# Patient Record
Sex: FEMALE | Race: WHITE | NOT HISPANIC OR LATINO | Employment: PART TIME | URBAN - NONMETROPOLITAN AREA
[De-identification: names, ages, dates, MRNs, and addresses within clinical notes are randomized per-mention and may not be internally consistent; named-entity substitution may affect disease eponyms.]

---
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
## 2020-01-02 DIAGNOSIS — F91.1 UNDERSOCIALIZED CONDUCT DISORDER, AGGRESSIVE TYPE: ICD-10-CM

## 2020-01-02 DIAGNOSIS — F91.3 OPPOSITIONAL DEFIANT DISORDER: ICD-10-CM

## 2020-01-02 RX ORDER — QUETIAPINE FUMARATE 200 MG/1
TABLET, FILM COATED ORAL
Qty: 90 TABLET | Refills: 0 | Status: SHIPPED | OUTPATIENT
Start: 2020-01-02 | End: 2020-02-03

## 2020-01-02 NOTE — LETTER
January 2, 2020      Fadia Roque  [address] [facility] [address]        Dear Fadia,           This letter is to remind you that you are due for your annual exam with Krista Bowman. Your last comprehensive visit was more than 12 months ago.    A refill request for your Seroquel has been sent to a provider for review and consideration. Additional refills require you to complete this appointment.    Please call the clinic at [phone] to schedule your appointment.    If you should require additional refills before your scheduled appointment, please contact your pharmacy and we will refill your medication until the date of your appointment.      Thank you for choosing [facility] and [facility] for your health care needs.    Sincerely,    Refill RN  [facility]

## 2020-01-02 NOTE — TELEPHONE ENCOUNTER
Disp Refills Start End MIRELA   QUEtiapine (SEROQUEL) 200 MG tablet 90 tablet 2 10/1/2019  No   Sig: TAKE 1 TABLET BY MOUTH THREE TIMES DAILY       LOV: 6/19/2018  Future Office visit:   No appointment scheduled at this time.    Annual reminder letter sent via mail. Attempted to contact patient. Left message via phone.     Routing refill request to provider for review/approval because:  Failed protocol  Unable to complete prescription refill per RN Medication Refill Policy.................... Madalyn Armstrong RN ....................  1/2/2020   11:54 AM    Requested Prescriptions   Pending Prescriptions Disp Refills     QUEtiapine (SEROQUEL) 200 MG tablet [Pharmacy Med Name: QUETIAPINE 200MG TABLETS] 90 tablet 2     Sig: TAKE 1 TABLET BY MOUTH THREE TIMES DAILY       Antipsychotic Medications Failed - 1/2/2020  9:02 AM        Failed - Blood pressure under 140/90 in past 12 months     BP Readings from Last 3 Encounters:   06/19/18 112/60 (56 %/ 27 %)*   03/19/18 128/75 (95 %/ 84 %)*   03/12/18 110/80 (50 %/ 94 %)*     *BP percentiles are based on the 2017 AAP Clinical Practice Guideline for girls                 Failed - Lipid panel on file within the past 12 months     Recent Labs   Lab Test 06/07/16  1057   TRIG 170*   HDL 38   LDL 83               Failed - CBC on file in past 12 months     Recent Labs   Lab Test 06/07/16  1038   GICHWBC 8.6   GICHRBC 4.85   HGB 14.5   HCT 42.5                    Failed - Heart Rate on file within past 12 months     Pulse Readings from Last 3 Encounters:   06/19/18 92   03/12/18 80   12/19/17 96               Failed - A1c or Glucose on file in past 12 months     Recent Labs   Lab Test 04/26/18  0859   GLC 85       Please review patients last 3 weights. If a weight gain of >10 lbs exists, you may refill the prescription once after instructing the patient to schedule an appointment within the next 30 days.    Wt Readings from Last 3 Encounters:   06/19/18 77.9 kg (171 lb 12.8  "oz) (94 %)*   03/19/18 77.1 kg (170 lb) (93 %)*   03/12/18 77.2 kg (170 lb 3.2 oz) (94 %)*     * Growth percentiles are based on [facility] (Girls, 2-20 Years) data.             Failed - Recent (6 mo) or future (30 days) visit within the authorizing provider's specialty     Patient had office visit in the last 6 months or has a visit in the next 30 days with authorizing provider or within the authorizing provider's specialty.  See \"Patient Info\" tab in inbasket, or \"Choose Columns\" in Meds & Orders section of the refill encounter.            Passed - Patient is 12 years of age or older        Passed - Medication is active on med list        Passed - Patient is not pregnant        Passed - No positve pregnancy test on file in past 12 months        "

## 2020-01-22 DIAGNOSIS — F91.1 UNDERSOCIALIZED CONDUCT DISORDER, AGGRESSIVE TYPE: ICD-10-CM

## 2020-01-22 DIAGNOSIS — F91.3 OPPOSITIONAL DEFIANT DISORDER: ICD-10-CM

## 2020-01-22 NOTE — LETTER
January 23, 2020      Fadia Roque  [address] [facility] [address]        Dear Fadia,         This letter is to remind you that you are due for your annual exam with Krista Bowman. Your last comprehensive visit was more than 12 months ago.    A refill request was sent for your clonidine to your provider for review and consideration. Additional refills require you to complete this appointment.    Please call the clinic at [phone] to schedule your appointment.    If you should require additional refills before your scheduled appointment, please contact your pharmacy and we will refill your medication until the date of your appointment.    Thank you for choosing [facility] and [facility] for your health care needs.    Sincerely,    Refill RN  [facility]

## 2020-01-23 RX ORDER — CLONIDINE HYDROCHLORIDE 0.1 MG/1
TABLET ORAL
Qty: 45 TABLET | Refills: 5 | Status: SHIPPED | OUTPATIENT
Start: 2020-01-23 | End: 2020-09-08

## 2020-01-23 NOTE — TELEPHONE ENCOUNTER
" Disp Refills Start End MIRELA   cloNIDine (CATAPRES) 0.1 MG tablet 60 tablet 5 6/19/2018  No   Sig - Route: Take 0.5 tablets (0.05 mg) by mouth 3 times daily - Oral       LOV: 6/19/2018  Future Office visit:    Next 5 appointments (look out 90 days)    Feb 04, 2020  7:45 AM CST  Nurse Only with GH INJECTION NURSE  [facility] ([facility]) [address]  [place] [address] [phone]        Office note: \" FOLLOW UP: in 6 months.  Will followup with neurology in 9 months. Krista Bowman MD \"    Annual reminder sent via mail.     Routing refill request to provider for review/approval because:  Failed Protocol    Requested Prescriptions   Pending Prescriptions Disp Refills     cloNIDine (CATAPRES) 0.1 MG tablet [Pharmacy Med Name: CLONIDINE 0.1MG TABLETS] 45 tablet      Sig: TAKE 1/2 TABLET BY MOUTH THREE TIMES DAILY DOSE= 0.05 MG= 1/2 TABLET       Central Acting Antiadrenergic Agents Failed - 1/23/2020 12:40 PM        Failed - Blood pressure under 140/90 in past 12 months     BP Readings from Last 3 Encounters:   06/19/18 112/60 (56 %/ 27 %)*   03/19/18 128/75 (95 %/ 84 %)*   03/12/18 110/80 (50 %/ 94 %)*     *BP percentiles are based on the 2017 AAP Clinical Practice Guideline for girls                 Failed - Recent (12 mo) or future (30 days) visit within the authorizing provider's specialty     Patient has had an office visit with the authorizing provider or a provider within the authorizing providers department within the previous 12 mos or has a future within next 30 days. See \"Patient Info\" tab in inbasket, or \"Choose Columns\" in Meds & Orders section of the refill encounter.              Failed - Normal serum creatinine on file within past 12 months     Recent Labs   Lab Test 04/26/18  0859   CR 0.80             Passed - Patient is 6 years of age or older        Passed - Medication is active on med list        Passed - Patient not pregnant        " Passed - No positive pregnancy test on file in past 12 months      Unable to complete prescription refill per RN Medication Refill Policy.................... Madalyn Armstrong RN ....................  1/23/2020   4:08 PM

## 2020-02-04 ENCOUNTER — ALLIED HEALTH/NURSE VISIT (OUTPATIENT)
Dept: FAMILY MEDICINE | Facility: OTHER | Age: 20
End: 2020-02-04
Attending: PEDIATRICS
Payer: MEDICAID

## 2020-02-04 DIAGNOSIS — Z30.42 ENCOUNTER FOR DEPO-PROVERA CONTRACEPTION: Primary | ICD-10-CM

## 2020-02-04 PROCEDURE — 25000128 H RX IP 250 OP 636: Performed by: PEDIATRICS

## 2020-02-04 PROCEDURE — 96372 THER/PROPH/DIAG INJ SC/IM: CPT

## 2020-02-04 RX ADMIN — MEDROXYPROGESTERONE ACETATE 150 MG: 150 INJECTION, SUSPENSION INTRAMUSCULAR at 07:58

## 2020-02-04 NOTE — PROGRESS NOTES
Verified patient's name and . Patient stated reason for visit today is to receive Depo. Patient denied any concerns with previous injections. Weight assessed: 176.4#. BP assessed: 122/80.    Patient requests ongoing injections of Depo-Provera  Date of last DMPA:  2019    The following questions asked by nurse:   Adverse reaction to last shot?  no  Heavy bleeding or more than 14 days bleeding sincelast shot?  no  Wants to continue contraception for another 3 months, knowing that fertility may not return for up to 18 months?  yes  Recent migraine headaches?  no  Breast problems since last shot?  no  Problems with excessive hair loss, acne or facial hair?  no    Depo Provera injection prepared and administered IM as ordered. Administration of medication documented in MAR (see MAR for further information regarding dose, lot #, NDC #, expiration date). Next Depo Provera injection in series due  - May 6, 2020. Patient provided appointment reminder card.       Grace CROCKETTN, RN on 2020 at 7:58 AM

## 2020-02-21 ENCOUNTER — OFFICE VISIT (OUTPATIENT)
Dept: PEDIATRICS | Facility: OTHER | Age: 20
End: 2020-02-21
Attending: PEDIATRICS
Payer: MEDICAID

## 2020-02-21 VITALS — SYSTOLIC BLOOD PRESSURE: 120 MMHG | DIASTOLIC BLOOD PRESSURE: 90 MMHG

## 2020-02-21 DIAGNOSIS — F91.3 OPPOSITIONAL DEFIANT DISORDER: ICD-10-CM

## 2020-02-21 DIAGNOSIS — N94.89 MENSTRUAL SUPPRESSION: ICD-10-CM

## 2020-02-21 DIAGNOSIS — Q82.5 CONGENITAL NEVUS OF SCALP: ICD-10-CM

## 2020-02-21 DIAGNOSIS — Z00.129 ENCOUNTER FOR ROUTINE CHILD HEALTH EXAMINATION W/O ABNORMAL FINDINGS: Primary | ICD-10-CM

## 2020-02-21 DIAGNOSIS — E66.3 OVERWEIGHT IN CHILDHOOD WITH BODY MASS INDEX (BMI) OF 85TH TO 94.9TH PERCENTILE: ICD-10-CM

## 2020-02-21 DIAGNOSIS — R62.50 DEVELOPMENTAL DELAY: ICD-10-CM

## 2020-02-21 DIAGNOSIS — G40.909 SEIZURE DISORDER (H): ICD-10-CM

## 2020-02-21 DIAGNOSIS — L24.9 IRRITANT HAND DERMATITIS: ICD-10-CM

## 2020-02-21 LAB
ALBUMIN SERPL-MCNC: 4.7 G/DL (ref 3.5–5.7)
ALP SERPL-CCNC: 59 U/L (ref 34–104)
ALT SERPL W P-5'-P-CCNC: 35 U/L (ref 7–52)
ANION GAP SERPL CALCULATED.3IONS-SCNC: 9 MMOL/L (ref 3–14)
AST SERPL W P-5'-P-CCNC: 23 U/L (ref 13–39)
BILIRUB SERPL-MCNC: 0.4 MG/DL (ref 0.3–1)
BUN SERPL-MCNC: 12 MG/DL (ref 7–25)
CALCIUM SERPL-MCNC: 9.8 MG/DL (ref 8.6–10.3)
CHLORIDE SERPL-SCNC: 105 MMOL/L (ref 98–107)
CHOLEST SERPL-MCNC: 137 MG/DL
CO2 SERPL-SCNC: 26 MMOL/L (ref 21–31)
CREAT SERPL-MCNC: 0.87 MG/DL (ref 0.6–1.2)
GFR SERPL CREATININE-BSD FRML MDRD: 84 ML/MIN/{1.73_M2}
GLUCOSE SERPL-MCNC: 131 MG/DL (ref 70–105)
HBA1C MFR BLD: 4.4 % (ref 4–6)
HDLC SERPL-MCNC: 36 MG/DL (ref 23–92)
LDLC SERPL CALC-MCNC: 83 MG/DL
NONHDLC SERPL-MCNC: 101 MG/DL
POTASSIUM SERPL-SCNC: 4 MMOL/L (ref 3.5–5.1)
PROT SERPL-MCNC: 7.8 G/DL (ref 6.4–8.9)
SODIUM SERPL-SCNC: 140 MMOL/L (ref 134–144)
T4 FREE SERPL-MCNC: 0.87 NG/DL (ref 0.6–1.6)
TRIGL SERPL-MCNC: 89 MG/DL
TSH SERPL DL<=0.05 MIU/L-ACNC: 3.06 IU/ML (ref 0.34–5.6)

## 2020-02-21 PROCEDURE — 84443 ASSAY THYROID STIM HORMONE: CPT | Mod: ZL | Performed by: PEDIATRICS

## 2020-02-21 PROCEDURE — 83036 HEMOGLOBIN GLYCOSYLATED A1C: CPT | Mod: ZL | Performed by: PEDIATRICS

## 2020-02-21 PROCEDURE — 80061 LIPID PANEL: CPT | Mod: ZL | Performed by: PEDIATRICS

## 2020-02-21 PROCEDURE — 80053 COMPREHEN METABOLIC PANEL: CPT | Mod: ZL | Performed by: PEDIATRICS

## 2020-02-21 PROCEDURE — 80175 DRUG SCREEN QUAN LAMOTRIGINE: CPT | Mod: ZL | Performed by: PEDIATRICS

## 2020-02-21 PROCEDURE — 36415 COLL VENOUS BLD VENIPUNCTURE: CPT | Mod: ZL | Performed by: PEDIATRICS

## 2020-02-21 PROCEDURE — 84439 ASSAY OF FREE THYROXINE: CPT | Mod: ZL | Performed by: PEDIATRICS

## 2020-02-21 PROCEDURE — 99214 OFFICE O/P EST MOD 30 MIN: CPT | Performed by: PEDIATRICS

## 2020-02-21 PROCEDURE — G0463 HOSPITAL OUTPT CLINIC VISIT: HCPCS

## 2020-02-21 RX ORDER — TRIAMCINOLONE ACETONIDE 1 MG/G
CREAM TOPICAL 2 TIMES DAILY
Qty: 80 G | Refills: 3 | Status: SHIPPED | OUTPATIENT
Start: 2020-02-21

## 2020-02-21 RX ORDER — MIDAZOLAM HYDROCHLORIDE 5 MG/ML
INJECTION INTRAMUSCULAR; INTRAVENOUS
Refills: 3 | COMMUNITY
Start: 2019-03-02 | End: 2022-02-21

## 2020-02-21 RX ORDER — LAMOTRIGINE 200 MG/1
TABLET ORAL
COMMUNITY
Start: 2020-01-26 | End: 2022-02-21

## 2020-02-21 ASSESSMENT — PAIN SCALES - GENERAL: PAINLEVEL: NO PAIN (0)

## 2020-02-21 ASSESSMENT — MIFFLIN-ST. JEOR: SCORE: 1534.57

## 2020-02-21 NOTE — NURSING NOTE
Patient presents with caregiver for 19 year physical.    Medication Reconciliation: complete    Caro Roldan LPN  2/21/2020 8:52 AM

## 2020-02-21 NOTE — PROGRESS NOTES
SUBJECTIVE:   Fadia Roque is a 19 year old female  who presents to clinic today with care giver, Analia because of:    Patient presents with:  Physical: 19 year       HPI  Fadia has been having a lot of behaviors between 3 and 7 pm.  She doesn't listen well and if you have to tell her more than three times, she screams and bites herself and behavior escalates.      No seizures since 2013    She has a mole by one of her ears.   It has been there since she was a small child.  It isn't bothering her, but mom questions if it is growing larger.      Fadia picks at her hands a lot.   The left hand is red and irritated.      Fadia's weight has been fairly stable.   She is exercising.  She is in [employer] at school.  She is girl  and will be going to camp.  She has custom training wheels on a regular bike.          ROS  Review of Systems   HENT:        Had a cold two weeks ago, but is now recovering.    Genitourinary:        Doesn't have periods on the Depo.  Has been on it since she was 13 or 14.    Neurological:        No recent seizures.    Psychiatric/Behavioral: Positive for behavioral problems.       PROBLEM LIST  Patient Active Problem List   Diagnosis     Undersocialized conduct disorder, aggressive type     Lack of coordination     Attention deficit hyperactivity disorder     Overweight in childhood with body mass index (BMI) of 85th to 94.9th percentile     Constipation     Contraceptive management     Developmental delay     Dermatitis, atopic     Oppositional defiant disorder     Seizure disorder (H)     Congenital nevus of scalp     Menstrual suppression     Irritant hand dermatitis       MEDICATIONS    Current Outpatient Medications:      clonazePAM (KLONOPIN) 0.5 MG tablet, Take 1 tablet by mouth 2 times daily if needed. 0.5 mg at HS or 1 mg with procedures, Disp: , Rfl:      cloNIDine (CATAPRES) 0.1 MG tablet, TAKE 1/2 TABLET BY MOUTH THREE TIMES DAILY DOSE= 0.05 MG= 1/2 TABLET, Disp: 45 tablet,  "Rfl: 5     LamoTRIgine (LAMICTAL) 200 MG TB24, Take 1.5 tablets by mouth 2 times daily, Disp: , Rfl:      levETIRAcetam (KEPPRA) 500 MG tablet, Take 500 mg by mouth 2 times daily, Disp: , Rfl:      medroxyPROGESTERone (DEPO-PROVERA) 150 MG/ML IM injection, Inject 1 mL (150 mg) into the muscle every 3 months, Disp: 1 mL, Rfl: 3     multivitamin, therapeutic with minerals (MULTI-VITAMIN) TABS tablet, Take 1 tablet by mouth daily, Disp: , Rfl:      pyridOXINE (VITAMIN B6) 100 MG TABS, Take 100 mg by mouth 2 times daily Take 150mg in am and 50mg Pm, Disp: , Rfl:      QUEtiapine (SEROQUEL) 200 MG tablet, TAKE 1 TABLET BY MOUTH THREE TIMES DAILY, Disp: 90 tablet, Rfl: 0     triamcinolone 0.1 % EX external cream, Apply topically 2 times daily, Disp: 80 g, Rfl: 3     VITAMIN D, CHOLECALCIFEROL, PO, Take 1,000 Units by mouth daily, Disp: , Rfl:      EMOLLIENT CREAM & WOUND GEL EX, Apply  topically to affected area(s) each time if needed for Dry Skin., Disp: , Rfl:      lamoTRIgine 200 MG PO tablet, , Disp: , Rfl:      midazolam 5 MG/ML IJ injection, , Disp: , Rfl: 3     ALLERGIES     Allergies   Allergen Reactions     Ceftriaxone      Rocephin      Other reaction(s): Seizures          OBJECTIVE:     BP (!) 120/90 (BP Location: Right arm, Patient Position: Sitting, Cuff Size: Adult Regular)   Pulse (P) 120   Temp (P) 97.5  F (36.4  C) (Tympanic)   Resp (P) 16   Ht (P) 5' 3.19\" (1.605 m)   Wt (P) 173 lb 9.6 oz (78.7 kg)   BMI (P) 30.57 kg/m        GENERAL: cooperative,  in no acute distress.  SKIN: irritation of the palm of the left hand.  1 x 0.5 cm congenital nevus above right ear.   HEAD: Normocephalic.  EYES:  No discharge or erythema. Normal pupils and EOM.  EARS: Normal canals. Tympanic membranes are normal; gray and translucent.  NOSE: Normal without discharge.  MOUTH/THROAT: Clear. No oral lesions. Teeth intact without obvious abnormalities.  NECK: Supple, no masses.  LYMPH NODES: No adenopathy  LUNGS: Clear. No " rales, rhonchi, wheezing or retractions  HEART: Regular rhythm. Normal S1/S2. No murmurs.  ABDOMEN: Soft, non-tender, not distended, no masses or hepatosplenomegaly. Bowel sounds normal.   : pete 4 female    DIAGNOSTICS: Diagnostics: No results found for this or any previous visit (from the past 24 hour(s)).    ASSESSMENT/PLAN:       ICD-10-CM    1. Encounter for routine child health examination w/o abnormal findings Z00.129    2. Seizure disorder (H) G40.909 lamoTRIgine 200 MG PO tablet     midazolam 5 MG/ML IJ injection     Keppra (Levetiracetam) Level     Lamotrigine Level     Comprehensive Metabolic Panel     Lipid Panel     Hemoglobin A1c     Thyrotropin GH     T4, Free     T4, Free     Thyrotropin GH     Hemoglobin A1c     Lipid Panel     Comprehensive Metabolic Panel     Lamotrigine Level     Keppra (Levetiracetam) Level    checked levels of seizure medication will send to M[facility] for Tuesday appointment.    3. Irritant hand dermatitis L24.9 triamcinolone 0.1 % EX external cream    Triamcinolone cream and lotion recommended.    4. Menstrual suppression N94.89 OB/GYN REFERRAL    referred to OB to discuss options for menstrual suppression as has been on Depo for over 5 years.    5. Congenital nevus of scalp Q82.5     1 x 0.5 cm, above right ear, will observe.    6. Overweight in childhood with body mass index (BMI) of 85th to 94.9th percentile E66.3 Lipid Panel    Z68.53 Hemoglobin A1c     Thyrotropin GH     T4, Free    weight is stable over the last few years.    7. Developmental delay R62.50     Guardianship plans are in place.  Will attend High school until age 21.    8. Oppositional defiant disorder F91.3     Will move Seroquel from noon to 3 pm, to see if behavior improves from 3-7pm.       Fadia will follow up with [facility] for her last visit and then establish care with adult neurology, Merary Nguyen in [place].  Labs checked for up coming [facility] visit.  Will fax results to Meredith.    See  above for plans.     Time spent was at least 25 minutes, more than half in counseling.        FOLLOW UP: If not improving or if worsening    Krista Bowman MD

## 2020-02-21 NOTE — PATIENT INSTRUCTIONS
Patient Education    [facility] PARENT  15 THROUGH 17 YEAR VISITS  Here are some suggestions from [facility] experts that may be of value to your family.     HOW YOUR FAMILY IS DOING  Set aside time to be with your teen and really listen to her hopes and concerns.  Support your teen in finding activities that interest him. Encourage your teen to help others in the community.  Help your teen find and be a part of positive after-school activities and sports.  Support your teen as she figures out ways to deal with stress, solve problems, and make decisions.  Help your teen deal with conflict.  If you are worried about your living or food situation, talk with us. Community agencies and programs such as SNAP can also provide information.    YOUR GROWING AND CHANGING TEEN  Make sure your teen visits the dentist at least twice a year.  Give your teen a fluoride supplement if the dentist recommends it.  Support your teen s healthy body weight and help him be a healthy eater.  Provide healthy foods.  Eat together as a family.  Be a role model.  Help your teen get enough calcium with low-fat or fat-free milk, low-fat yogurt, and cheese.  Encourage at least 1 hour of physical activity a day.  Praise your teen when she does something well, not just when she looks good.    YOUR TEEN S FEELINGS  If you are concerned that your teen is sad, depressed, nervous, irritable, hopeless, or angry, let us know.  If you have questions about your teen s sexual development, you can always talk with us.    HEALTHY BEHAVIOR CHOICES  Know your teen s friends and their parents. Be aware of where your teen is and what he is doing at all times.  Talk with your teen about your values and your expectations on drinking, drug use, tobacco use, driving, and sex.  Praise your teen for healthy decisions about sex, tobacco, alcohol, and other drugs.  Be a role model.  Know your teen s friends and their activities together.  Lock your  liquor in a cabinet.  Store prescription medications in a locked cabinet.  Be there for your teen when she needs support or help in making healthy decisions about her behavior.    SAFETY  Encourage safe and responsible driving habits.  Lap and shoulder seat belts should be used by everyone.  Limit the number of friends in the car and ask your teen to avoid driving at night.  Discuss with your teen how to avoid risky situations, who to call if your teen feels unsafe, and what you expect of your teen as a .  Do not tolerate drinking and driving.  If it is necessary to keep a gun in your home, store it unloaded and locked with the ammunition locked separately from the gun.      Consistent with Bright Futures: Guidelines for Health Supervision of Infants, Children, and Adolescents, 4th Edition  For more information, go to https://brightfutures.aap.org.      Change the noon seroquel to 3 pm.      Apply triamcinolone twice daily and hand lotion after washing until rash resolves.     Take a picture of the the mole with a reference item (ruler), so we can follow it over time.  Follow up with Meredith and then make an appointment with adult neurology to establish care.     Follow up with OB to discuss menstrual suppression.

## 2020-02-23 LAB — LAMOTRIGINE SERPL-MCNC: 13 UG/ML (ref 2.5–15)

## 2020-02-25 ENCOUNTER — TRANSFERRED RECORDS (OUTPATIENT)
Dept: HEALTH INFORMATION MANAGEMENT | Facility: OTHER | Age: 20
End: 2020-02-25

## 2020-03-01 ENCOUNTER — OFFICE VISIT (OUTPATIENT)
Dept: FAMILY MEDICINE | Facility: OTHER | Age: 20
End: 2020-03-01
Attending: PHYSICIAN ASSISTANT
Payer: MEDICAID

## 2020-03-01 VITALS
DIASTOLIC BLOOD PRESSURE: 64 MMHG | OXYGEN SATURATION: 98 % | RESPIRATION RATE: 18 BRPM | WEIGHT: 172.9 LBS | BODY MASS INDEX: 30.87 KG/M2 | HEART RATE: 94 BPM | TEMPERATURE: 98.5 F | SYSTOLIC BLOOD PRESSURE: 104 MMHG

## 2020-03-01 DIAGNOSIS — R05.9 COUGH: ICD-10-CM

## 2020-03-01 DIAGNOSIS — H65.02 NON-RECURRENT ACUTE SEROUS OTITIS MEDIA OF LEFT EAR: Primary | ICD-10-CM

## 2020-03-01 DIAGNOSIS — J03.90 TONSILLITIS: ICD-10-CM

## 2020-03-01 LAB
FLUAV+FLUBV RNA SPEC QL NAA+PROBE: NEGATIVE
FLUAV+FLUBV RNA SPEC QL NAA+PROBE: NEGATIVE
RSV RNA SPEC NAA+PROBE: NEGATIVE
SPECIMEN SOURCE: NORMAL
SPECIMEN SOURCE: NORMAL
STREP GROUP A PCR: NOT DETECTED

## 2020-03-01 PROCEDURE — G0463 HOSPITAL OUTPT CLINIC VISIT: HCPCS | Performed by: NURSE PRACTITIONER

## 2020-03-01 PROCEDURE — 99213 OFFICE O/P EST LOW 20 MIN: CPT | Performed by: NURSE PRACTITIONER

## 2020-03-01 PROCEDURE — 87651 STREP A DNA AMP PROBE: CPT | Mod: ZL | Performed by: NURSE PRACTITIONER

## 2020-03-01 PROCEDURE — 87631 RESP VIRUS 3-5 TARGETS: CPT | Mod: ZL | Performed by: NURSE PRACTITIONER

## 2020-03-01 ASSESSMENT — ENCOUNTER SYMPTOMS
NAUSEA: 0
ACTIVITY CHANGE: 1
DIAPHORESIS: 0
COUGH: 1
EYES NEGATIVE: 1
CARDIOVASCULAR NEGATIVE: 1
APPETITE CHANGE: 0
FATIGUE: 1
FEVER: 1
VOMITING: 0
DIARRHEA: 0

## 2020-03-01 NOTE — PROGRESS NOTES
SUBJECTIVE:   Fadia Roque is a 19 year old female presenting with a chief complaint of   Chief Complaint   Patient presents with     Nasal Congestion     for  4 days       She is an established patient of [facility].    [facility] Adult    Onset of symptoms was 3 day(s) ago.  Course of illness is worsening.    Severity moderate  Current and Associated symptoms: fever, cough - productive, sore throat, hoarse voice and fatigue  Treatment measures tried include Tylenol/Ibuprofen, OTC Cough med, Fluids and Rest.  Predisposing factors include ill contact: .  Lives at day care.       Review of Systems   Constitutional: Positive for activity change, fatigue and fever. Negative for appetite change and diaphoresis.   HENT: Positive for congestion.         Patient has some difficulty relating symptoms.      Eyes: Negative.    Respiratory: Positive for cough.         Some congestion   Cardiovascular: Negative.    Gastrointestinal: Negative for diarrhea, nausea and vomiting.        Caregiver reports she had GI bug 10 days ago but lasted about 1 day.    Genitourinary: Negative.        Past Medical History:   Diagnosis Date     Attention-deficit hyperactivity disorder     05/02/06,See Dr. Bonilla's note     Epilepsy without status epilepticus, not intractable (H)     complex partial seizures with secondary generalization triggered by fevers; followed by Dr. Hu     Oppositional defiant disorder     emotionally reactive aggressive behavior     Other disorders of psychological development      10/2005,Testing done at [place]     Other postprocedural complications and disorders of digestive system     8 and 9     Personal history of other diseases of the female genital tract     Vaginal delivery at 39 weeks gestation     Personal history of other medical treatment (CODE)     No Comments Provided     Pervasive developmental disorder     05/02/06,See Dr. Bonilla's note     Family History   Problem Relation Age of Onset     Other -  See Comments Maternal Grandfather         Increased cholesterol     Seizure Disorder Other         Seizures,Seizures as a child     Current Outpatient Medications   Medication Sig Dispense Refill     clonazePAM (KLONOPIN) 0.5 MG tablet Take 1 tablet by mouth 2 times daily if needed. 0.5 mg at HS or 1 mg with procedures       cloNIDine (CATAPRES) 0.1 MG tablet TAKE 1/2 TABLET BY MOUTH THREE TIMES DAILY DOSE= 0.05 MG= 1/2 TABLET 45 tablet 5     EMOLLIENT CREAM & WOUND GEL EX Apply  topically to affected area(s) each time if needed for Dry Skin.       LamoTRIgine (LAMICTAL) 200 MG TB24 Take 1.5 tablets by mouth 2 times daily       lamoTRIgine 200 MG PO tablet        levETIRAcetam (KEPPRA) 500 MG tablet Take 500 mg by mouth 2 times daily       medroxyPROGESTERone (DEPO-PROVERA) 150 MG/ML IM injection Inject 1 mL (150 mg) into the muscle every 3 months 1 mL 3     midazolam 5 MG/ML IJ injection   3     multivitamin, therapeutic with minerals (MULTI-VITAMIN) TABS tablet Take 1 tablet by mouth daily       pyridOXINE (VITAMIN B6) 100 MG TABS Take 100 mg by mouth 2 times daily Take 150mg in am and 50mg Pm       triamcinolone 0.1 % EX external cream Apply topically 2 times daily 80 g 3     VITAMIN D, CHOLECALCIFEROL, PO Take 1,000 Units by mouth daily       QUEtiapine (SEROQUEL) 200 MG tablet Take 1 tablet (200 mg) by mouth 3 times daily 270 tablet 1     Social History     Tobacco Use     Smoking status: Passive Smoke Exposure - Never Smoker     Smokeless tobacco: Never Used     Tobacco comment: Quit smoking: Patient's mother smokes in the house   Substance Use Topics     Alcohol use: No     Alcohol/week: 0.0 standard drinks       OBJECTIVE  /64   Pulse 94   Temp 98.5  F (36.9  C) (Tympanic)   Resp 18   Wt 78.4 kg (172 lb 14.4 oz)   LMP  (LMP Unknown)   SpO2 98%   Breastfeeding No   BMI (P) 30.44 kg/m      Physical Exam  Constitutional:       General: She is not in acute distress.     Appearance: Normal  appearance. She is ill-appearing.   HENT:      Head: Normocephalic and atraumatic.      Right Ear: Tympanic membrane normal. There is no impacted cerumen.      Left Ear: There is no impacted cerumen.   Skin:     General: Skin is warm and dry.   Neurological:      General: No focal deficit present.      Mental Status: She is alert.      Comments: Caregiver reports sleepier than usual, no other changes.    Psychiatric:         Mood and Affect: Mood normal.         Behavior: Behavior normal.      Comments: A little more irritable than usual.          Labs:   Results for orders placed or performed in visit on 03/01/20   Group A Streptococcus PCR Throat Swab     Status: None    Specimen: Throat   Result Value Ref Range    Specimen Description Throat     Strep Group A PCR Not Detected NDET^Not Detected   Influenza A and B and RSV PCR     Status: None   Result Value Ref Range    Specimen Description Nasopharyngeal     Influenza A PCR Negative NEG^Negative    Influenza B PCR Negative NEG^Negative    Resp Syncytial Virus Negative NEG^Negative       X-Ray was not done.    ASSESSMENT:      ICD-10-CM    1. Non-recurrent acute serous otitis media of left ear  H65.02    2. Tonsillitis  J03.90 Group A Streptococcus PCR Throat Swab   3. Cough  R05 Influenza A and B and RSV PCR        Medical Decision Making:    Differential Diagnosis:  URI Adult/Peds:  Bronchitis-viral, Influenza, Strep pharyngitis, Viral pharyngitis and Viral upper respiratory illness    Serious Comorbid Conditions:  Adult:  Seizure disorder and developmental delay.    PLAN:    URI Adult:  Tylenol, Ibuprofen, Fluids, Rest, OTC cough suppressant/expectorant and Saline gargles  Discussed and pt and caregiver agree to testing for influenza, RSV and strep, due to living at a day care setting. Will call and Rx if anything returns positive.     5:19 Call placed to caregiver and notified results were negative. Continue with supportive, symptomatic care as discussed.      Followup:    If not improving or if condition worsens, follow up with your Primary Care Provider    Patient Instructions   We did a swab for influenza and strep, If either of those come back positive I will call Mom and Maria [facility] and call in a prescription penicillin if strep, and Tamiflu for influenza.     What am I to do (the plan):   O  Get plenty of fluids to drink.  O  Make sure that you get plenty of rest.   O  Take Acetaminophen (Tylenol) or Ibuprofen (Motrin or Advil) for fever or discomfort.  O  May try Diphenhydramine at bedtime only.  Do not use for more than 3-4 days in a row.  (Benadryl Allergy Elixir).     How will I know the plan above is not working:    O  If you have a fever above 101 degrees or more.  Check this at least 2-3 times per day   O  If you are having problems with your breathing (such as breathing that makes it challanging to eat or talk)  O  If you get new symptoms that you are worried about  O  If you are not better within 10 -14 days.      Who should I call if the plan is not working:  If you do not think you are getting better, your should contact your regular health care provider to discuss what you should do next.     When should I return for a follow-up visit:   Return as needed.

## 2020-03-01 NOTE — PATIENT INSTRUCTIONS
We did a swab for influenza and strep, If either of those come back positive I will call Mom and [facility] and call in a prescription penicillin if strep, and Tamiflu for influenza.     What am I to do (the plan):   O  Get plenty of fluids to drink.  O  Make sure that you get plenty of rest.   O  Take Acetaminophen (Tylenol) or Ibuprofen (Motrin or Advil) for fever or discomfort.  O  May try Diphenhydramine at bedtime only.  Do not use for more than 3-4 days in a row.  (Benadryl Allergy Elixir).     How will I know the plan above is not working:    O  If you have a fever above 101 degrees or more.  Check this at least 2-3 times per day   O  If you are having problems with your breathing (such as breathing that makes it challanging to eat or talk)  O  If you get new symptoms that you are worried about  O  If you are not better within 10 -14 days.      Who should I call if the plan is not working:  If you do not think you are getting better, your should contact your regular health care provider to discuss what you should do next.     When should I return for a follow-up visit:   Return as needed.

## 2020-03-01 NOTE — RESULT ENCOUNTER NOTE
I spoke to Fadia's primary care giver, confirmed her identification and notified her that all her labs were negative and continue supportive care as we discussed.    Valencia ESCOBAR .............3/1/2020    5:19 PM

## 2020-03-01 NOTE — NURSING NOTE
"Chief Complaint   Patient presents with     Nasal Congestion     for  4 days   She has had runny nose for 4 days. She started coughing yesterday.  Liliana Naik LPN..................3/1/2020   12:39 PM      Initial /64   Pulse 94   Temp 98.5  F (36.9  C) (Tympanic)   Resp 18   Wt 78.4 kg (172 lb 14.4 oz)   LMP  (LMP Unknown)   SpO2 98%   Breastfeeding No   BMI (P) 30.44 kg/m   Estimated body mass index is 30.44 kg/m  (pended) as calculated from the following:    Height as of 2/21/20: (P) 1.605 m (5' 3.19\").    Weight as of this encounter: 78.4 kg (172 lb 14.4 oz).  Medication Reconciliation: complete    Liliana Naik LPN  "

## 2020-03-02 DIAGNOSIS — F91.1 UNDERSOCIALIZED CONDUCT DISORDER, AGGRESSIVE TYPE: ICD-10-CM

## 2020-03-02 DIAGNOSIS — F91.3 OPPOSITIONAL DEFIANT DISORDER: ICD-10-CM

## 2020-03-02 RX ORDER — QUETIAPINE FUMARATE 200 MG/1
200 TABLET, FILM COATED ORAL 3 TIMES DAILY
Qty: 270 TABLET | Refills: 1 | Status: SHIPPED | OUTPATIENT
Start: 2020-03-02 | End: 2020-09-03

## 2020-03-02 NOTE — TELEPHONE ENCOUNTER
Disp Refills Start End MIRELA   QUEtiapine (SEROQUEL) 200 MG tablet 90 tablet 0 2/3/2020  No   Sig: TAKE 1 TABLET BY MOUTH THREE TIMES DAILY     Patient was to follow up with Dr. Bowman before due for next refill.       LOV: 2/21/2020  Future Office visit:   No future appointment scheduled at this time.    Routing refill request to provider for review/approval because:  Failed Protocol    Requested Prescriptions   Pending Prescriptions Disp Refills     QUEtiapine (SEROQUEL) 200 MG tablet 270 tablet 3     Sig: Take 1 tablet (200 mg) by mouth 3 times daily       Antipsychotic Medications Failed - 3/2/2020 12:58 PM        Failed - CBC on file in past 12 months     Recent Labs   Lab Test 06/07/16  1038   GICHWBC 8.6   GICHRBC 4.85   HGB 14.5   HCT 42.5                    Passed - Blood pressure under 140/90 in past 12 months     BP Readings from Last 3 Encounters:   03/01/20 104/64   02/21/20 (!) 120/90   06/19/18 112/60 (56 %/ 27 %)*     *BP percentiles are based on the 2017 AAP Clinical Practice Guideline for girls                 Passed - Patient is 12 years of age or older        Passed - Lipid panel on file within the past 12 months     Recent Labs   Lab Test 02/21/20  0953   CHOL 137   TRIG 89   HDL 36   LDL 83   NHDL 101               Passed - Heart Rate on file within past 12 months     Pulse Readings from Last 3 Encounters:   03/01/20 94   02/21/20 (P) 120   06/19/18 92               Passed - A1c or Glucose on file in past 12 months     Recent Labs   Lab Test 02/21/20  0953   *   A1C 4.4       Please review patients last 3 weights. If a weight gain of >10 lbs exists, you may refill the prescription once after instructing the patient to schedule an appointment within the next 30 days.    Wt Readings from Last 3 Encounters:   03/01/20 78.4 kg (172 lb 14.4 oz) (93 %)*   02/21/20 (P) 78.7 kg (173 lb 9.6 oz) (93 %)*   06/19/18 77.9 kg (171 lb 12.8 oz) (94 %)*     * Growth percentiles are based on CDC  "(Girls, 2-20 Years) data.             Passed - Medication is active on med list        Passed - Patient is not pregnant        Passed - No positve pregnancy test on file in past 12 months        Passed - Recent (6 mo) or future (30 days) visit within the authorizing provider's specialty     Patient had office visit in the last 6 months or has a visit in the next 30 days with authorizing provider or within the authorizing provider's specialty.  See \"Patient Info\" tab in inbasket, or \"Choose Columns\" in Meds & Orders section of the refill encounter.          Unable to complete prescription refill per RN Medication Refill Policy.................... Madalyn Armstrong RN ....................  3/2/2020   1:34 PM  "

## 2020-03-11 ENCOUNTER — OFFICE VISIT (OUTPATIENT)
Dept: OBGYN | Facility: OTHER | Age: 20
End: 2020-03-11
Attending: PEDIATRICS
Payer: MEDICAID

## 2020-03-11 VITALS
OXYGEN SATURATION: 98 % | HEART RATE: 84 BPM | WEIGHT: 175.6 LBS | RESPIRATION RATE: 20 BRPM | DIASTOLIC BLOOD PRESSURE: 72 MMHG | SYSTOLIC BLOOD PRESSURE: 110 MMHG | BODY MASS INDEX: 31.35 KG/M2

## 2020-03-11 DIAGNOSIS — N94.89 MENSTRUAL SUPPRESSION: Primary | ICD-10-CM

## 2020-03-11 PROCEDURE — G0463 HOSPITAL OUTPT CLINIC VISIT: HCPCS

## 2020-03-11 PROCEDURE — 99203 OFFICE O/P NEW LOW 30 MIN: CPT | Performed by: OBSTETRICS & GYNECOLOGY

## 2020-03-11 RX ORDER — CLONAZEPAM 0.5 MG/1
0.5 TABLET ORAL
Status: CANCELLED | COMMUNITY

## 2020-03-11 ASSESSMENT — PAIN SCALES - GENERAL: PAINLEVEL: NO PAIN (0)

## 2020-03-11 NOTE — PROGRESS NOTES
Gynecology Visit    CC: menstrual suppression    HPI:    Fadia Roque is a 19 year old , here for discussion of menstrual suppression. She has developmental delay and epilepsy. She has been on Depo for 6-7 years for menstrual suppression due to hygiene concerns, which has resulted in amenorrhea. Prior to starting Depo, it was also noted that she was having increased seizure activity at the time of her menses every month. Since starting Depo, she has not had any seizures. Her PCP discussed the concerns regarding long-term Depo use on bone health and recommended she come in for further discussion.      PMHx:   Past Medical History:   Diagnosis Date     Attention-deficit hyperactivity disorder     06,See Dr. Bonilla's note     Epilepsy without status epilepticus, not intractable (H)     complex partial seizures with secondary generalization triggered by fevers; followed by Dr. Hu     Oppositional defiant disorder     emotionally reactive aggressive behavior     Other disorders of psychological development      10/2005,Testing done at [facility]     Other postprocedural complications and disorders of digestive system     8 and 9     Personal history of other diseases of the female genital tract     Vaginal delivery at 39 weeks gestation     Personal history of other medical treatment (CODE)     No Comments Provided     Pervasive developmental disorder     06,See Dr. Bonilla's note      PSHx:   Past Surgical History:   Procedure Laterality Date     OTHER SURGICAL HISTORY      77529.0,PAST SURGICAL HISTORY,No surgeries.      Meds:   Current Outpatient Medications   Medication     clonazePAM (KLONOPIN) 0.5 MG tablet     cloNIDine (CATAPRES) 0.1 MG tablet     EMOLLIENT CREAM & WOUND GEL EX     LamoTRIgine (LAMICTAL) 200 MG TB24     lamoTRIgine 200 MG PO tablet     levETIRAcetam (KEPPRA) 500 MG tablet     medroxyPROGESTERone (DEPO-PROVERA) 150 MG/ML IM injection     midazolam 5 MG/ML IJ injection      multivitamin, therapeutic with minerals (MULTI-VITAMIN) TABS tablet     pyridOXINE (VITAMIN B6) 100 MG TABS     QUEtiapine (SEROQUEL) 200 MG tablet     triamcinolone 0.1 % EX external cream     VITAMIN D, CHOLECALCIFEROL, PO     No current facility-administered medications for this visit.        Allergies:       Allergies   Allergen Reactions     Ceftriaxone      Rocephin      Other reaction(s): Seizures       SocHx:   Social History     Tobacco Use     Smoking status: Passive Smoke Exposure - Never Smoker     Smokeless tobacco: Never Used     Tobacco comment: Quit smoking: Patient's mother smokes in the house   Substance Use Topics     Alcohol use: No     Alcohol/week: 0.0 standard drinks     Drug use: No     Has tri-guardianship with her mother, father, and a closely family friend that has helped raise her since she was 4 (Analia). Is in high school.     FamHx:   Family History   Problem Relation Age of Onset     Other - See Comments Maternal Grandfather         Increased cholesterol     Seizure Disorder Other         Seizures,Seizures as a child        Physical Exam  /72 (BP Location: Right arm, Patient Position: Sitting, Cuff Size: Adult Regular)   Pulse 84   Resp 20   Wt 79.7 kg (175 lb 9.6 oz)   LMP  (LMP Unknown)   SpO2 98%   Breastfeeding No   BMI (P) 30.92 kg/m    Gen: Well-appearing, NAD  Resp: nonlabored  Psych: cheerful and conversant      Assessment/Plan  Fadia Roque is a 19 year old  female here for menstrual discussion. Due to menstrual-related seizure activity that has resolved with ovarian suppression, discussed options available including Depo and combined-hormonal contraception. Based on [employer] recommendations, Depo is category I but CHC is category 3 with lamotrigine use. Discussed that increased risks of bone loss with prolonged Depo use, however, young women will rebound their bone health once they stop the Depo. Discussed option for Mirena IUD placement in the OR, which  most frequently provides bleeding suppression but does not suppress the ovaries and it is possible that her seizure activity will return. After this discussion, her family has decided to continue with Depo for now and may consider an IUD in the future.     30 minutes were spent with the patient with >50% spent counseling on menstrual and ovarian suppression.      Amber Rea MD  OB/GYN  3/11/2020 8:49 AM

## 2020-04-20 ENCOUNTER — TELEPHONE (OUTPATIENT)
Dept: OBGYN | Facility: OTHER | Age: 20
End: 2020-04-20

## 2020-04-20 DIAGNOSIS — N94.89 MENSTRUAL SUPPRESSION: Primary | ICD-10-CM

## 2020-04-20 RX ORDER — MEDROXYPROGESTERONE ACETATE 150 MG/ML
150 INJECTION, SUSPENSION INTRAMUSCULAR CONTINUOUS PRN
Status: COMPLETED | OUTPATIENT
Start: 2020-04-22 | End: 2021-01-08

## 2020-04-20 NOTE — TELEPHONE ENCOUNTER
New CAM order needed for Depo Provera injection. Patient seen in March 2020 by [facility] for discussion of options for menstrual suppression. Family opted to continue with Depo Provera. Order meredith'd up. Will route to [facility] for review and consideration.       Grace CROCKETTN, RN on 4/20/2020 at 9:38 AM

## 2020-04-22 ENCOUNTER — ALLIED HEALTH/NURSE VISIT (OUTPATIENT)
Dept: FAMILY MEDICINE | Facility: OTHER | Age: 20
End: 2020-04-22
Attending: PEDIATRICS
Payer: MEDICAID

## 2020-04-22 VITALS — BODY MASS INDEX: 31.64 KG/M2 | DIASTOLIC BLOOD PRESSURE: 84 MMHG | WEIGHT: 177.2 LBS | SYSTOLIC BLOOD PRESSURE: 138 MMHG

## 2020-04-22 DIAGNOSIS — Z30.42 ENCOUNTER FOR DEPO-PROVERA CONTRACEPTION: Primary | ICD-10-CM

## 2020-04-22 PROCEDURE — 25000128 H RX IP 250 OP 636: Performed by: OBSTETRICS & GYNECOLOGY

## 2020-04-22 PROCEDURE — 96372 THER/PROPH/DIAG INJ SC/IM: CPT

## 2020-04-22 RX ADMIN — MEDROXYPROGESTERONE ACETATE 150 MG: 150 INJECTION, SUSPENSION INTRAMUSCULAR at 13:40

## 2020-04-22 NOTE — PROGRESS NOTES
Verified patient's first and last name, and . Patient stated reason for visit today is to receive Depo. Patient denied any concerns with previous injections. Weight assessed: 177.2#. BP assessed: 138/84.    Patient requests ongoing injections of Depo-Provera  Date of last DMPA:  2020  The following questions asked by nurse:   Adverse reaction to last shot?  No  Heavy bleeding or more than 14 days bleeding sincelast shot?  No  Wants to continue contraception for another 3 months, knowing that fertility may not return for up to 18 months?  Yes  Recent migraine headaches?  No  Breast problems since last shot?  No  Problems with excessive hair loss, acne or facial hair?  No    Depo Provera injection prepared and administered IM into right deltoid as ordered. Administration of medication documented in MAR (see MAR for further information regarding dose, lot #, NDC #, expiration date). Patient encouraged to wait in lobby for 15 minutes post-injection and notify staff immediately of any reaction. Next Depo Provera injection in series due -2020. Patient provided appointment reminder card.       Jay Lemus RN ....................  2020   1:28 PM

## 2020-07-21 ENCOUNTER — ALLIED HEALTH/NURSE VISIT (OUTPATIENT)
Dept: FAMILY MEDICINE | Facility: OTHER | Age: 20
End: 2020-07-21
Attending: PEDIATRICS
Payer: MEDICAID

## 2020-07-21 VITALS — BODY MASS INDEX: 31.32 KG/M2 | SYSTOLIC BLOOD PRESSURE: 116 MMHG | WEIGHT: 175.4 LBS | DIASTOLIC BLOOD PRESSURE: 78 MMHG

## 2020-07-21 DIAGNOSIS — Z30.42 ENCOUNTER FOR DEPO-PROVERA CONTRACEPTION: Primary | ICD-10-CM

## 2020-07-21 PROCEDURE — 96372 THER/PROPH/DIAG INJ SC/IM: CPT

## 2020-07-21 PROCEDURE — 25000128 H RX IP 250 OP 636: Performed by: OBSTETRICS & GYNECOLOGY

## 2020-07-21 RX ADMIN — MEDROXYPROGESTERONE ACETATE 150 MG: 150 INJECTION, SUSPENSION INTRAMUSCULAR at 09:12

## 2020-07-21 NOTE — PROGRESS NOTES
Clinic Administered Medication Documentation    Depo Provera Documentation    URINE HCG: not indicated    Depo-Provera Standing Order inclusion/exclusion criteria reviewed.   Patient meets: inclusion criteria     BP: 116/78  LAST PAP/EXAM: No results found for: PAP    Prior to injection, verified patient identity using patient's name and date of birth. Medication was administered. Please see MAR and medication order for additional information.     Was entire vial of medication used? Yes  Vial/Syringe: Single dose vial  Expiration Date:  11/2021  NEXT INJECTION DUE: 10/6/20 - 10/20/20    Appointment reminder card provided to patient.       Grace CROCKETTN, RN on 7/21/2020 at 9:16 AM

## 2020-09-08 ENCOUNTER — OFFICE VISIT (OUTPATIENT)
Dept: PEDIATRICS | Facility: OTHER | Age: 20
End: 2020-09-08
Attending: PEDIATRICS
Payer: MEDICAID

## 2020-09-08 VITALS
HEIGHT: 63 IN | HEART RATE: 88 BPM | WEIGHT: 171.4 LBS | RESPIRATION RATE: 24 BRPM | TEMPERATURE: 98.3 F | SYSTOLIC BLOOD PRESSURE: 128 MMHG | BODY MASS INDEX: 30.37 KG/M2 | DIASTOLIC BLOOD PRESSURE: 82 MMHG

## 2020-09-08 DIAGNOSIS — Z30.42 ENCOUNTER FOR MANAGEMENT AND INJECTION OF INJECTABLE PROGESTIN CONTRACEPTIVE: ICD-10-CM

## 2020-09-08 DIAGNOSIS — F91.1 UNDERSOCIALIZED CONDUCT DISORDER, AGGRESSIVE TYPE: ICD-10-CM

## 2020-09-08 DIAGNOSIS — F91.3 OPPOSITIONAL DEFIANT DISORDER: ICD-10-CM

## 2020-09-08 DIAGNOSIS — G40.909 SEIZURE DISORDER (H): Primary | ICD-10-CM

## 2020-09-08 PROCEDURE — G0463 HOSPITAL OUTPT CLINIC VISIT: HCPCS

## 2020-09-08 PROCEDURE — 99215 OFFICE O/P EST HI 40 MIN: CPT | Performed by: PEDIATRICS

## 2020-09-08 RX ORDER — MULTIPLE VITAMINS W/ MINERALS TAB 9MG-400MCG
1 TAB ORAL DAILY
Qty: 100 TABLET | Refills: 3 | Status: SHIPPED | OUTPATIENT
Start: 2020-09-08 | End: 2022-02-21

## 2020-09-08 RX ORDER — QUETIAPINE FUMARATE 200 MG/1
TABLET, FILM COATED ORAL
Qty: 90 TABLET | Refills: 5 | Status: SHIPPED | OUTPATIENT
Start: 2020-09-08 | End: 2021-02-26

## 2020-09-08 RX ORDER — LEVETIRACETAM 500 MG/1
500 TABLET ORAL 2 TIMES DAILY
Qty: 60 TABLET | Refills: 5 | Status: SHIPPED | OUTPATIENT
Start: 2020-09-08 | End: 2020-10-05

## 2020-09-08 RX ORDER — MEDROXYPROGESTERONE ACETATE 150 MG/ML
150 INJECTION, SUSPENSION INTRAMUSCULAR
Qty: 1 ML | Refills: 3 | OUTPATIENT
Start: 2020-09-08 | End: 2022-02-21

## 2020-09-08 RX ORDER — CLONAZEPAM 0.5 MG/1
TABLET ORAL
Qty: 30 TABLET | Refills: 5 | Status: SHIPPED | OUTPATIENT
Start: 2020-09-08 | End: 2021-03-19

## 2020-09-08 RX ORDER — CLONIDINE HYDROCHLORIDE 0.1 MG/1
TABLET ORAL
Qty: 45 TABLET | Refills: 5 | Status: SHIPPED | OUTPATIENT
Start: 2020-09-08 | End: 2021-09-02

## 2020-09-08 RX ORDER — LAMOTRIGINE 200 MG/1
1.5 TABLET, EXTENDED RELEASE ORAL 2 TIMES DAILY
Qty: 90 TABLET | Refills: 5 | Status: SHIPPED | OUTPATIENT
Start: 2020-09-08 | End: 2021-03-26

## 2020-09-08 SDOH — HEALTH STABILITY: MENTAL HEALTH: HOW OFTEN DO YOU HAVE A DRINK CONTAINING ALCOHOL?: NEVER

## 2020-09-08 ASSESSMENT — MIFFLIN-ST. JEOR: SCORE: 1516.6

## 2020-09-08 ASSESSMENT — ENCOUNTER SYMPTOMS
SEIZURES: 0
COUGH: 0
FEVER: 0
ACTIVITY CHANGE: 0

## 2020-09-08 ASSESSMENT — PAIN SCALES - GENERAL: PAINLEVEL: NO PAIN (0)

## 2020-09-08 NOTE — PROGRESS NOTES
SUBJECTIVE:   Fadia Roque is a 20 year old female  who presents to clinic today with mother because of:    Patient presents with:  Recheck Medication      HPI School is starting on the  and Soni plans to go in person half days.  She is working on keeping her mask on and not invading other people's space.      Soni is on a combination of lamictal, keppra,and Seroquel.  She has been seizure free for the last seven years.  She has nasal versed for emergency use which doesn't  for another year.    Two years ago they tried to take her off her seizure medications and had a dramatic increase in behaviors, so they have been reluctant to change anything since then.  She has labs drawn yearly, last 2020.  Fadia was supposed to establish care with Dr. Merary Nguyen, but was unable to do so due to the Covid-19 pandemic.      Soni has guardianship papers that were put in place two years ago when she turned 18.  Her parents and Analia (her caregiver) have three way custody.  She has started to help at [employer]  and that may be a long term place of employment for her.      Soni is on the depoprovera shot and has been for the last 7 years.  Her last seizure was during a period.  Soni saw OB/Gyn and they recommended that she stay on depo for a few more years.  They recommended impact exercises to preserve bone density.      Constipation is under good control.          Family History   Problem Relation Age of Onset     Other - See Comments Maternal Grandfather         Increased cholesterol     Seizure Disorder Other         Seizures,Seizures as a child            ROS  Review of Systems   Constitutional: Negative for activity change and fever.   HENT: Negative for congestion.    Respiratory: Negative for cough.    Gastrointestinal:        Constipation under good control.    Genitourinary:        Amenorrheic   Musculoskeletal:        Right knee pain from falling at home.    Neurological:  Negative for seizures.   Psychiatric/Behavioral:        Behaviors are under acceptable control on current medications.      Social History     Social History Narrative    Foster mom - Cyn Webber (Maggie).    She is with foster mom weekdays and every other weekend since Fadia was 4yrs. Old.     Previously in early childhood development.  Keke Urbina Mom.  Desiree Mayer Aunt.   Nydia Grandmother.     Parents and Foster mom have co-guardianship.          PROBLEM LIST  Patient Active Problem List   Diagnosis     Undersocialized conduct disorder, aggressive type     Lack of coordination     Attention deficit hyperactivity disorder     Overweight in childhood with body mass index (BMI) of 85th to 94.9th percentile     Constipation     Contraceptive management     Developmental delay     Dermatitis, atopic     Oppositional defiant disorder     Seizure disorder (H)     Congenital nevus of scalp     Menstrual suppression     Irritant hand dermatitis       MEDICATIONS    Current Outpatient Medications:      clonazePAM (KLONOPIN) 0.5 MG tablet, Take 1 tablet by mouth 2 times daily if needed. 0.5 mg at HS or 1 mg with procedures, Disp: 30 tablet, Rfl: 5     cloNIDine (CATAPRES) 0.1 MG tablet, TAKE 1/2 TABLET BY MOUTH THREE TIMES DAILY DOSE= 0.05 MG= 1/2 TABLET, Disp: 45 tablet, Rfl: 5     EMOLLIENT CREAM & WOUND GEL EX, Apply  topically to affected area(s) each time if needed for Dry Skin., Disp: , Rfl:      LamoTRIgine (LAMICTAL) 200 MG TB24, Take 1.5 tablets by mouth 2 times daily, Disp: 90 tablet, Rfl: 5     lamoTRIgine 200 MG PO tablet, , Disp: , Rfl:      levETIRAcetam (KEPPRA) 500 MG tablet, Take 1 tablet (500 mg) by mouth 2 times daily, Disp: 60 tablet, Rfl: 5     medroxyPROGESTERone (DEPO-PROVERA) 150 MG/ML IM injection, Inject 1 mL (150 mg) into the muscle every 3 months, Disp: 1 mL, Rfl: 3     midazolam 5 MG/ML IJ injection, , Disp: , Rfl: 3     multivitamin w/minerals (MULTI-VITAMIN) tablet, Take 1 tablet by  "mouth daily, Disp: 100 tablet, Rfl: 3     pyridOXINE (VITAMIN B6) 100 MG TABS, Take 1 tablet (100 mg) by mouth 2 times daily Take 150mg in am and 50mg Pm, Disp: 100 tablet, Rfl: 4     QUEtiapine (SEROQUEL) 200 MG tablet, TAKE 1 TABLET(200 MG) BY MOUTH THREE TIMES DAILY, Disp: 90 tablet, Rfl: 5     triamcinolone 0.1 % EX external cream, Apply topically 2 times daily, Disp: 80 g, Rfl: 3     VITAMIN D, CHOLECALCIFEROL, PO, Take 1,000 Units by mouth daily, Disp: , Rfl:     Current Facility-Administered Medications:      medroxyPROGESTERone (DEPO-PROVERA) injection 150 mg, 150 mg, Intramuscular, Continuous PRN, Amber Rea MD, 150 mg at 07/21/20 0912     ALLERGIES     Allergies   Allergen Reactions     Ceftriaxone      Rocephin      Other reaction(s): Seizures          OBJECTIVE:     /82 (BP Location: Right arm, Patient Position: Sitting, Cuff Size: Adult Large)   Pulse 88   Temp 98.3  F (36.8  C) (Axillary)   Resp 24   Ht 5' 3\" (1.6 m)   Wt 171 lb 6.4 oz (77.7 kg)   BMI 30.36 kg/m        GENERAL: Active, alert, in no acute distress.  SKIN: Clear. No significant rash, abnormal pigmentation or lesions  HEAD: Normocephalic.  EYES:  No discharge or erythema. Normal pupils and EOM.  EARS: Normal canals. Tympanic membranes are normal; gray and translucent.  NOSE: Normal without discharge.  MOUTH/THROAT: Clear. No oral lesions. Teeth intact without obvious abnormalities.  NECK: Supple, no masses.  LYMPH NODES: No adenopathy  LUNGS: Clear. No rales, rhonchi, wheezing or retractions  HEART: Regular rhythm. Normal S1/S2. No murmurs.  ABDOMEN: Soft, non-tender, not distended, no masses or hepatosplenomegaly. Bowel sounds normal.   Ext: knees have normal range of motion, no bruising or swelling. No limp    DIAGNOSTICS: Diagnostics: None    ASSESSMENT/PLAN:       ICD-10-CM    1. Seizure disorder (H)  G40.909 clonazePAM (KLONOPIN) 0.5 MG tablet     LamoTRIgine (LAMICTAL) 200 MG TB24     levETIRAcetam (KEPPRA) 500 " MG tablet     multivitamin w/minerals (MULTI-VITAMIN) tablet     pyridOXINE (VITAMIN B6) 100 MG TABS     NEUROLOGY ADULT REFERRAL    levels checked yearly, last 2/2020   2. Oppositional defiant disorder  F91.3 clonazePAM (KLONOPIN) 0.5 MG tablet     cloNIDine (CATAPRES) 0.1 MG tablet     QUEtiapine (SEROQUEL) 200 MG tablet   3. Undersocialized conduct disorder, aggressive type  F91.1 clonazePAM (KLONOPIN) 0.5 MG tablet     cloNIDine (CATAPRES) 0.1 MG tablet     QUEtiapine (SEROQUEL) 200 MG tablet   4. Encounter for management and injection of injectable progestin contraceptive  Z30.42 medroxyPROGESTERone (DEPO-PROVERA) 150 MG/ML IM injection      We discussed the transition to adult medicine and adult life.  She will need continuing care throughout her life and it sounds like there is a good plan in place.   I would like Fadia to establish care with adult neurology and by the time she is 21, have transitioned to an adult primary care provider.  Mom sees Dr. Breaux, and will think about seeing him for Fadia as well.  The current medical regimen is effective;  continue present plan and medications for seizures and behavior.      We will continue the depo at the recommendation of Ob/Gyn.  Consider transition to IUD or nexplanon as she gets older.      I reassured Soni that her knee doesn't require therapy.      Time spent was at least 40 minutes morethan half in counseling.      FOLLOW UP: in 6 months.     Krista Bowman MD

## 2020-09-08 NOTE — PATIENT INSTRUCTIONS
The current medical regimen is effective;  continue present plan and medications.    We would like a copy of the guardianship papers.

## 2020-09-08 NOTE — NURSING NOTE
Pt here with mom for a f/u on her meds.  Emelina Fowler CMA ([facility])......................9/8/2020  8:07 AM       Medication Reconciliation: complete    Emelina Fowler CMA  9/8/2020 8:07 AM

## 2020-10-05 DIAGNOSIS — G40.909 SEIZURE DISORDER (H): ICD-10-CM

## 2020-10-05 RX ORDER — LEVETIRACETAM 500 MG/1
1000 TABLET ORAL 2 TIMES DAILY
Qty: 120 TABLET | Refills: 5 | Status: SHIPPED | OUTPATIENT
Start: 2020-10-05 | End: 2021-04-09

## 2020-10-16 ENCOUNTER — ALLIED HEALTH/NURSE VISIT (OUTPATIENT)
Dept: FAMILY MEDICINE | Facility: OTHER | Age: 20
End: 2020-10-16
Attending: PEDIATRICS
Payer: MEDICAID

## 2020-10-16 VITALS — BODY MASS INDEX: 30.68 KG/M2 | DIASTOLIC BLOOD PRESSURE: 76 MMHG | WEIGHT: 173.2 LBS | SYSTOLIC BLOOD PRESSURE: 118 MMHG

## 2020-10-16 DIAGNOSIS — Z23 NEED FOR PROPHYLACTIC VACCINATION AND INOCULATION AGAINST INFLUENZA: Primary | ICD-10-CM

## 2020-10-16 PROCEDURE — 96372 THER/PROPH/DIAG INJ SC/IM: CPT | Performed by: OBSTETRICS & GYNECOLOGY

## 2020-10-16 PROCEDURE — G0008 ADMIN INFLUENZA VIRUS VAC: HCPCS

## 2020-10-16 PROCEDURE — 250N000011 HC RX IP 250 OP 636: Performed by: OBSTETRICS & GYNECOLOGY

## 2020-10-16 RX ADMIN — MEDROXYPROGESTERONE ACETATE 150 MG: 150 INJECTION, SUSPENSION INTRAMUSCULAR at 13:22

## 2020-10-16 NOTE — PROGRESS NOTES
Immunization: Adult  Verified patient's name and . Stated reason for visit today is to receive flu vaccine(s). Denied any concerns with previous immunizations. Allergies reviewed.  Screening Questionnaire Adult Immunization completed (see below). VIS handout(s) reviewed and given to take home. flu prepared and administered IM per standing order. Administration documented in IMMUNIZATIONS (see MIIC and order for further information). Instructed to wait in lobby for 15 minutes post-injection and notify RN immediately of any adverse reaction.     Screening Questionnaire for Adult Immunization    Are you sick today?   No   Do you have allergies to vaccines or vaccine components?   No   Have you ever had a serious reaction after receiving a vaccination?   No   Have you received any vaccinations in the past 4 weeks?   No     Immunization questionnaire answers were all negative.    Clinic Administered Medication Documentation    Depo Provera Documentation    URINE HCG: not indicated    Depo-Provera Standing Order inclusion/exclusion criteria reviewed.   Patient meets: inclusion criteria     BP: 118/76  LAST PAP/EXAM: No results found for: PAP    Prior to injection, verified patient identity using patient's name and date of birth. Medication was administered. Please see MAR and medication order for additional information.     Was entire vial of medication used? Yes  Vial/Syringe: Single dose vial  Expiration Date:  2022    Patient instructed to remain in clinic for 15 minutes.  NEXT INJECTION DUE: 21 - 1/15/21    Appointment reminder card provided to patient.

## 2020-11-20 ENCOUNTER — TELEPHONE (OUTPATIENT)
Dept: PEDIATRICS | Facility: OTHER | Age: 20
End: 2020-11-20

## 2020-11-20 NOTE — TELEPHONE ENCOUNTER
Carey states that patient was scheduled to see Dr. Nguyen (neurologist) in January. The appointment was cancelled by their clinic and rescheduled for April. There is another Neurologist that they can see sooner but mom really wants patient to see Dr. Nguyen. She is wondering if you will be willing to refill her medications until she is seen by Dr. Nguyen.     Shima Ortiz CMA on 11/20/2020 at 9:50 AM

## 2020-11-20 NOTE — TELEPHONE ENCOUNTER
They have prescriptions through January, are you still OK filling her medications until she has her appointment with Dr. Nguyen in April?  Ernestine Brewer LPN ...... 11/20/2020 4:01 PM

## 2020-11-20 NOTE — TELEPHONE ENCOUNTER
States pt's neurologist appt with Dr Nguyen on 1/ needs to be rescheduled. States the appt will be in April now. Would like to know if [facility] would be willing to fill pt's medications until then

## 2021-01-08 ENCOUNTER — ALLIED HEALTH/NURSE VISIT (OUTPATIENT)
Dept: FAMILY MEDICINE | Facility: OTHER | Age: 21
End: 2021-01-08
Attending: PEDIATRICS
Payer: MEDICAID

## 2021-01-08 VITALS — SYSTOLIC BLOOD PRESSURE: 124 MMHG | WEIGHT: 175.2 LBS | BODY MASS INDEX: 31.04 KG/M2 | DIASTOLIC BLOOD PRESSURE: 76 MMHG

## 2021-01-08 DIAGNOSIS — Z30.42 ENCOUNTER FOR DEPO-PROVERA CONTRACEPTION: Primary | ICD-10-CM

## 2021-01-08 PROCEDURE — 96372 THER/PROPH/DIAG INJ SC/IM: CPT | Performed by: OBSTETRICS & GYNECOLOGY

## 2021-01-08 PROCEDURE — 250N000011 HC RX IP 250 OP 636: Performed by: OBSTETRICS & GYNECOLOGY

## 2021-01-08 RX ADMIN — MEDROXYPROGESTERONE ACETATE 150 MG: 150 INJECTION, SUSPENSION INTRAMUSCULAR at 08:14

## 2021-01-08 NOTE — PROGRESS NOTES
Verified patient's first and last name, and . Patient stated reason for visit today is to receive depo. Patient denied any concerns with previous injections. Administration of medication documented in MAR (see MAR for further information regarding dose, lot #, NDC #, expiration date). Patient encouraged to wait in lobby for 15 minutes post-injection and notify staff immediately of any reaction.         Chasidy Shepherd RN ....................  2021   8:06 AM

## 2021-02-26 ENCOUNTER — TELEPHONE (OUTPATIENT)
Dept: PEDIATRICS | Facility: OTHER | Age: 21
End: 2021-02-26

## 2021-02-26 DIAGNOSIS — F91.3 OPPOSITIONAL DEFIANT DISORDER: ICD-10-CM

## 2021-02-26 DIAGNOSIS — F91.1 UNDERSOCIALIZED CONDUCT DISORDER, AGGRESSIVE TYPE: ICD-10-CM

## 2021-02-26 RX ORDER — QUETIAPINE FUMARATE 200 MG/1
TABLET, FILM COATED ORAL
Qty: 90 TABLET | Refills: 5 | Status: SHIPPED | OUTPATIENT
Start: 2021-02-26 | End: 2022-02-14

## 2021-02-26 NOTE — TELEPHONE ENCOUNTER
Please call christ, caregiver, she needs the RX Quetiapine  Refilled soon.  The patient will be out this weekend.         Meseret Hernadez on 2/26/2021 at 4:13 PM

## 2021-02-26 NOTE — TELEPHONE ENCOUNTER
Please let Analia know med is refilled and sent to [facility].  Signed by Krista Bowman MD .....2/26/2021 4:34 PM

## 2021-03-18 DIAGNOSIS — F91.1 UNDERSOCIALIZED CONDUCT DISORDER, AGGRESSIVE TYPE: ICD-10-CM

## 2021-03-18 DIAGNOSIS — F91.3 OPPOSITIONAL DEFIANT DISORDER: ICD-10-CM

## 2021-03-18 DIAGNOSIS — G40.909 SEIZURE DISORDER (H): ICD-10-CM

## 2021-03-18 NOTE — LETTER
March 19, 2021      Fadia Roque  [address] [id number]        Dear Fadia,                 This letter is to remind you that you are due for your follow up appointment with Krista Bowman. Your last office visit was on 9/8/2020. It was recommended to follow up in 6 months.    A refill request for Klonopin has been sent to your provider for review and consideration. Additional refills require you to complete this appointment.    Please call the clinic at [phone] to schedule your appointment.    If you should require additional refills before your scheduled appointment, please contact your pharmacy and we will refill your medication until the date of your appointment.    If you are no longer seeing Krista Bowman for primary care, please call to let us know.     Thank you for choosing [facility] and [facility] for your health care needs.    Sincerely,    Refill NICOLAS  [facility]

## 2021-03-19 RX ORDER — CLONAZEPAM 0.5 MG/1
TABLET ORAL
Qty: 30 TABLET | Refills: 5 | Status: SHIPPED | OUTPATIENT
Start: 2021-03-19 | End: 2021-09-20

## 2021-03-19 NOTE — TELEPHONE ENCOUNTER
Disp Refills Start End MIRELA   clonazePAM (KLONOPIN) 0.5 MG tablet 30 tablet 5 9/8/2020  No   Sig: Take 1 tablet by mouth 2 times daily if needed. 0.5 mg at HS or 1 mg with procedures       LOV: 9/8/2020  Future Office visit: No future appointment scheduled at this time. Follow up reminder sent via mail.    Routing refill request to provider for review/approval because:  Drug not on the [facility], [facility] or [facility] refill protocol or controlled substance    Requested Prescriptions   Pending Prescriptions Disp Refills     clonazePAM (KLONOPIN) 0.5 MG tablet 30 tablet 5     Sig: Take 1 tablet by mouth 2 times daily if needed. 0.5 mg at HS or 1 mg with procedures       There is no refill protocol information for this order        Unable to complete prescription refill per RN Medication Refill Policy.................... Madalyn Armstrong RN ....................  3/19/2021   11:05 AM

## 2021-03-25 DIAGNOSIS — G40.909 SEIZURE DISORDER (H): ICD-10-CM

## 2021-03-25 NOTE — TELEPHONE ENCOUNTER
Routing refill request to provider for review/approval because:  Drug not on the [facility] refill protocol     Dr. Bowman out of office will route to covering provider for review and  Consideration.    Sandy Rea RN on 3/25/2021 at 8:11 AM

## 2021-03-26 RX ORDER — LAMOTRIGINE 200 MG/1
1.5 TABLET, EXTENDED RELEASE ORAL 2 TIMES DAILY
Qty: 90 TABLET | Refills: 1 | Status: SHIPPED | OUTPATIENT
Start: 2021-03-26 | End: 2022-02-21

## 2021-03-26 NOTE — TELEPHONE ENCOUNTER
Refilled lamotrigine for 6 months, patient needs to be transitioning to adult neurology and adult primary care provider as she will be 21 soon. Radha Duffy MD on 3/26/2021 at 8:02 AM

## 2021-04-02 ENCOUNTER — ALLIED HEALTH/NURSE VISIT (OUTPATIENT)
Dept: FAMILY MEDICINE | Facility: OTHER | Age: 21
End: 2021-04-02
Attending: PEDIATRICS
Payer: MEDICAID

## 2021-04-02 VITALS — WEIGHT: 172 LBS | SYSTOLIC BLOOD PRESSURE: 108 MMHG | DIASTOLIC BLOOD PRESSURE: 70 MMHG | BODY MASS INDEX: 30.47 KG/M2

## 2021-04-02 DIAGNOSIS — Z30.42 ENCOUNTER FOR DEPO-PROVERA CONTRACEPTION: Primary | ICD-10-CM

## 2021-04-02 PROCEDURE — 96372 THER/PROPH/DIAG INJ SC/IM: CPT | Performed by: PEDIATRICS

## 2021-04-02 PROCEDURE — 250N000011 HC RX IP 250 OP 636: Performed by: PEDIATRICS

## 2021-04-02 RX ORDER — MEDROXYPROGESTERONE ACETATE 150 MG/ML
150 INJECTION, SUSPENSION INTRAMUSCULAR
Status: ACTIVE | OUTPATIENT
Start: 2021-04-02

## 2021-04-02 RX ADMIN — MEDROXYPROGESTERONE ACETATE 150 MG: 150 INJECTION, SUSPENSION INTRAMUSCULAR at 08:41

## 2021-04-02 NOTE — PROGRESS NOTES
Clinic Administered Medication Documentation     Depo Provera Documentation      URINE HCG:not indicated     Depo-Provera Standing Order inclusion/exclusion criteria reviewed.   Patient meets: inclusion criteria    BP: 108/70  LAST PAP/EXAM: No results found for: PAP    Prior to injection, verified patient identity using patient's name and date of birth. Medication was administered. Please see MAR and medication order for additional information.     Was entire vial of medication used? Yes  Vial/Syringe: Single dose vial  Expiration Date:  09/01/2022    Patient instructed to remain in clinic for 15 minutes and report any adverse reaction to staff immediately .  NEXT INJECTION DUE: 6/18/21 - 7/2/21      Heather Celaya RN on 4/2/2021 at 8:43 AM

## 2021-04-08 DIAGNOSIS — G40.909 SEIZURE DISORDER (H): ICD-10-CM

## 2021-04-08 NOTE — TELEPHONE ENCOUNTER
Disp Refills Start End MIRELA   levETIRAcetam (KEPPRA) 500 MG tablet 120 tablet 5 10/5/2020  No   Sig - Route: Take 2 tablets (1,000 mg) by mouth 2 times daily - Oral       LOV: 9/8/2020  Future Office visit: No future appointment schedule       Routing refill request to provider for review/approval because:  Drug not on the [facility], [facility] or [facility] refill protocol or controlled substance    Requested Prescriptions   Pending Prescriptions Disp Refills     levETIRAcetam (KEPPRA) 500 MG tablet [Pharmacy Med Name: LEVETIRACETAM 500MG TABLETS] 120 tablet 5     Sig: TAKE 2 TABLETS(1000 MG) BY MOUTH TWICE DAILY       There is no refill protocol information for this order        Unable to complete prescription refill per RN Medication Refill Policy.................... Madalyn Armstrong RN ....................  4/8/2021   4:27 PM

## 2021-04-09 RX ORDER — LEVETIRACETAM 500 MG/1
TABLET ORAL
Qty: 120 TABLET | Refills: 5 | Status: SHIPPED | OUTPATIENT
Start: 2021-04-09 | End: 2022-02-21

## 2021-04-22 ENCOUNTER — TRANSFERRED RECORDS (OUTPATIENT)
Dept: HEALTH INFORMATION MANAGEMENT | Facility: OTHER | Age: 21
End: 2021-04-22

## 2021-05-21 ENCOUNTER — IMMUNIZATION (OUTPATIENT)
Dept: FAMILY MEDICINE | Facility: OTHER | Age: 21
End: 2021-05-21
Attending: FAMILY MEDICINE
Payer: MEDICAID

## 2021-05-21 PROCEDURE — 91300 PR COVID VAC PFIZER DIL RECON 30 MCG/0.3 ML IM: CPT

## 2021-06-11 ENCOUNTER — IMMUNIZATION (OUTPATIENT)
Dept: FAMILY MEDICINE | Facility: OTHER | Age: 21
End: 2021-06-11
Attending: FAMILY MEDICINE
Payer: MEDICAID

## 2021-06-11 PROCEDURE — 0002A PR COVID VAC PFIZER DIL RECON 30 MCG/0.3 ML IM: CPT

## 2021-06-25 ENCOUNTER — ALLIED HEALTH/NURSE VISIT (OUTPATIENT)
Dept: FAMILY MEDICINE | Facility: OTHER | Age: 21
End: 2021-06-25
Attending: PEDIATRICS
Payer: MEDICAID

## 2021-06-25 VITALS — BODY MASS INDEX: 30.61 KG/M2 | WEIGHT: 172.8 LBS | DIASTOLIC BLOOD PRESSURE: 70 MMHG | SYSTOLIC BLOOD PRESSURE: 110 MMHG

## 2021-06-25 DIAGNOSIS — Z30.42 ENCOUNTER FOR DEPO-PROVERA CONTRACEPTION: Primary | ICD-10-CM

## 2021-06-25 PROCEDURE — 96372 THER/PROPH/DIAG INJ SC/IM: CPT | Performed by: PEDIATRICS

## 2021-06-25 PROCEDURE — 250N000011 HC RX IP 250 OP 636: Performed by: PEDIATRICS

## 2021-06-25 RX ADMIN — MEDROXYPROGESTERONE ACETATE 150 MG: 150 INJECTION, SUSPENSION INTRAMUSCULAR at 08:12

## 2021-06-25 NOTE — PROGRESS NOTES
Patient requests ongoing injections of Depo-Provera      Verified patient's first and last name, and . Patient stated reason for visit today is to receive a Depo Provera injection. Patient denied any concerns with previous injections.     BP: 110/70  WEIGHT: 172.8#  LAST PAP/EXAM: No results found for: PAP  URINE HCG:not indicated    The following medication was given:   MEDICATION: Depo Provera 150mg  ROUTE: IM  SITE: Deltoid - Right    Depo Provera injection prepared and administered IM as ordered. Administration of medication documented in MAR (see MAR for further information regarding dose, lot #, NDC #, expiration date). Patient encouraged to wait in lobby for 15 minutes post-injection and notify staff immediately of any reaction. Next Depo Provera injection in series due NEXT INJECTION DUE: 9/10/21 - 21 . Patient provided appointment reminder card.       Skye Baldwin RN ....................  2021   8:15 AM

## 2021-07-07 DIAGNOSIS — G40.909 SEIZURE DISORDER (H): ICD-10-CM

## 2021-07-07 NOTE — TELEPHONE ENCOUNTER
[facility] sent Rx request for the following:   pyridOXINE (VITAMIN B6) 100 MG TABS  Sig: TAKE 1.5 TABLETS BY MOUTH EVERY MORNING AND 1/2 TABLET BY MOUTH EVERY EVENING    Last Prescription Date:   9/8/20  Last Fill Qty/Refills:         100, R-4    Last Office Visit:              9/8/20 (Myriam)   Future Office visit:           none    Routing refill request to provider for review/approval because:  Drug not on the [facility] refill protocol     Gilma Navarrete RN ....................  7/7/2021   12:00 PM

## 2021-07-09 ENCOUNTER — TELEPHONE (OUTPATIENT)
Dept: PEDIATRICS | Facility: OTHER | Age: 21
End: 2021-07-09

## 2021-07-09 NOTE — TELEPHONE ENCOUNTER
Received incoming form from [employer].   Last Office visit at [facility] 9-8-2020, medication check.  Last Office visit with Neurology 4-.  Only future visit listed in the computer is with Neurology on 10-.    Will patient need to make an office visit?  Jeanine Lopez LPN 7/9/2021 10:43 AM

## 2021-07-09 NOTE — TELEPHONE ENCOUNTER
We had discussed transitioning Cassidi to adult medicine at 21.  She should make an appointment with an adult provider to establish care.  Mom was thinking about Dr. Breaux.   Signed by Krista Bowman MD .....7/9/2021 11:02 AM

## 2021-07-09 NOTE — TELEPHONE ENCOUNTER
Mom was notified of providers note.  Mom thought that Dr. Bowman was still helping her until she was done with school.  She is still currently in school now.  Mom was wondering if there was anyway this can be completed for this summer before she changes providers.      Jeanine Lopez LPN 7/9/2021 1:00 PM

## 2021-07-12 NOTE — TELEPHONE ENCOUNTER
After patient's name and date of birth verified the below information was given.    Grace Young LPN ---- 7/12/2021 9:46 AM

## 2021-08-30 DIAGNOSIS — F91.3 OPPOSITIONAL DEFIANT DISORDER: ICD-10-CM

## 2021-08-30 DIAGNOSIS — F91.1 UNDERSOCIALIZED CONDUCT DISORDER, AGGRESSIVE TYPE: ICD-10-CM

## 2021-08-30 NOTE — LETTER
September 2, 2021      Fadia Roque  [address] [address] [address]        Dear Fadia,         A refill of cloNIDine (CATAPRES) 0.1 MG tablet has been requested by your pharmacy and we noticed that you are due for an annual exam.  Your last comprehensive visit with Dr. Bowman was on 09/08/2020.    This refill request has been sent to your provider for consideration at this time.    Your health is very important to us.  Please call the clinic at [phone] to schedule your appointment.    Thank you for choosing [facility] and [facility] for your health care needs.    Sincerely,    Refill RN  [facility]

## 2021-09-02 RX ORDER — CLONIDINE HYDROCHLORIDE 0.1 MG/1
TABLET ORAL
Qty: 45 TABLET | Refills: 0 | Status: SHIPPED | OUTPATIENT
Start: 2021-09-02 | End: 2021-10-05

## 2021-09-02 NOTE — TELEPHONE ENCOUNTER
"[facility] sent Rx request for the following:   cloNIDine (CATAPRES) 0.1 MG tablet  SigTAKE 1/2 TABLET(0.05 MG) BY MOUTH THREE TIMES DAILY    Last Prescription Date:   09/08/2020  Last Fill Qty/Refills:         45, R-5    Last Office Visit:              09/08/2020 (Colby)   Future Office visit:           None noted   Central Acting Antiadrenergic Agents Failed - 8/30/2021 11:20 AM        Failed - Normal serum creatinine on file within past 12 months     Recent Labs   Lab Test 02/21/20  0953   CR 0.87       Ok to refill medication if creatinine is low         Antiadrenergic Antihypertensives Failed - 8/30/2021 11:20 AM        Failed - Normal serum creatinine on file in past 12 months     Recent Labs   Lab Test 02/21/20  0953   CR 0.87       Ok to refill medication if creatinine is low          Failed - Recent (6 mo) or future (30 days) visit within the authorizing provider's specialty     Patient had office visit in the last 6 months or has a visit in the next 30 days with authorizing provider or within the authorizing provider's specialty.  See \"Patient Info\" tab in inbasket, or \"Choose Columns\" in Meds & Orders section of the refill encounter.         Patient's mother previously advised for follow-up in 6 months from LOV. Patient has seen Dr. Nguyen in neurology since encounter for establishment of care concerning seizures. Routing for PCP review and consideration. Letter sent regarding annual appointment need. Unable to complete prescription refill per RN Medication Refill Policy.................... Kerry Irving RN ....................  9/2/2021   10:25 AM        "

## 2021-09-15 ENCOUNTER — ALLIED HEALTH/NURSE VISIT (OUTPATIENT)
Dept: FAMILY MEDICINE | Facility: OTHER | Age: 21
End: 2021-09-15
Payer: MEDICAID

## 2021-09-15 VITALS — BODY MASS INDEX: 30.11 KG/M2 | DIASTOLIC BLOOD PRESSURE: 70 MMHG | SYSTOLIC BLOOD PRESSURE: 110 MMHG | WEIGHT: 170 LBS

## 2021-09-15 DIAGNOSIS — Z30.42 ENCOUNTER FOR DEPO-PROVERA CONTRACEPTION: Primary | ICD-10-CM

## 2021-09-15 DIAGNOSIS — Z23 NEED FOR VACCINATION: ICD-10-CM

## 2021-09-15 PROCEDURE — 90686 IIV4 VACC NO PRSV 0.5 ML IM: CPT

## 2021-09-15 PROCEDURE — G0008 ADMIN INFLUENZA VIRUS VAC: HCPCS

## 2021-09-15 PROCEDURE — 250N000011 HC RX IP 250 OP 636: Performed by: PEDIATRICS

## 2021-09-15 PROCEDURE — 96372 THER/PROPH/DIAG INJ SC/IM: CPT | Performed by: PEDIATRICS

## 2021-09-15 RX ADMIN — MEDROXYPROGESTERONE ACETATE 150 MG: 150 INJECTION, SUSPENSION INTRAMUSCULAR at 08:30

## 2021-09-15 NOTE — PROGRESS NOTES
Patient requests ongoing injections of Depo-Provera      Verified patient's first and last name, and . Patient stated reason for visit today is to receive a Depo Provera injection. Patient denied any concerns with previous injections.     BP: 110/70  WEIGHT: 170#  LAST PAP/EXAM: No results found for: PAP  URINE HCG:not indicated    The following medication was given:   MEDICATION: Depo Provera 150mg  ROUTE: IM  SITE: Deltoid - Right    Depo Provera injection prepared and administered IM as ordered. Administration of medication documented in MAR (see MAR for further information regarding dose, lot #, NDC #, expiration date). Patient encouraged to wait in lobby for 15 minutes post-injection and notify staff immediately of any reaction. Next Depo Provera injection in series due NEXT INJECTION DUE: 21 - 12/15/21 . Patient provided appointment reminder card.       Pt also elected to get her annual influenza vaccination at this visit.    Skye Baldwin RN ....................  9/15/2021   8:20 AM

## 2021-09-18 DIAGNOSIS — G40.909 SEIZURE DISORDER (H): ICD-10-CM

## 2021-09-18 DIAGNOSIS — F91.1 UNDERSOCIALIZED CONDUCT DISORDER, AGGRESSIVE TYPE: ICD-10-CM

## 2021-09-18 DIAGNOSIS — F91.3 OPPOSITIONAL DEFIANT DISORDER: ICD-10-CM

## 2021-09-20 RX ORDER — CLONAZEPAM 0.5 MG/1
TABLET ORAL
Qty: 40 TABLET | Refills: 2 | Status: SHIPPED | OUTPATIENT
Start: 2021-09-20 | End: 2022-02-21

## 2021-09-20 NOTE — TELEPHONE ENCOUNTER
CLONAZEPAM 0.5MG TABLETS   Last Written Prescription Date:  3/19/21  Last Fill Quantity: 30,   # refills: 5  Last Office Visit: 9/8/20  Future Office visit:       Routing refill request to provider for review/approval because:  Drug not on the [facility] or [facility] refill protocol or controlled substance.  Unable to complete prescription refill per RNMedication Refill Policy.................... Maryanne Villar RN ....................  9/20/2021   3:36 PM

## 2021-10-05 DIAGNOSIS — F91.3 OPPOSITIONAL DEFIANT DISORDER: ICD-10-CM

## 2021-10-05 DIAGNOSIS — F91.1 UNDERSOCIALIZED CONDUCT DISORDER, AGGRESSIVE TYPE: ICD-10-CM

## 2021-10-05 RX ORDER — CLONIDINE HYDROCHLORIDE 0.1 MG/1
TABLET ORAL
Qty: 45 TABLET | Refills: 5 | Status: SHIPPED | OUTPATIENT
Start: 2021-10-05 | End: 2022-02-21

## 2021-11-22 ENCOUNTER — TELEPHONE (OUTPATIENT)
Dept: PEDIATRICS | Facility: OTHER | Age: 21
End: 2021-11-22
Payer: MEDICAID

## 2021-11-22 NOTE — TELEPHONE ENCOUNTER
I recommended either Dr. Blair or Dr. Christa Mata.  Signed by Krista Bowman MD .....11/22/2021 11:03 AM

## 2021-11-22 NOTE — TELEPHONE ENCOUNTER
Cyn is going to start transitioning her to a non pediatric soon and would like to know if Dr. Bowman can suggest someone-please call her

## 2021-11-24 ENCOUNTER — ALLIED HEALTH/NURSE VISIT (OUTPATIENT)
Dept: FAMILY MEDICINE | Facility: OTHER | Age: 21
End: 2021-11-24
Attending: FAMILY MEDICINE
Payer: MEDICAID

## 2021-11-24 DIAGNOSIS — R05.9 COUGH: Primary | ICD-10-CM

## 2021-11-24 DIAGNOSIS — Z20.822 EXPOSURE TO 2019 NOVEL CORONAVIRUS: ICD-10-CM

## 2021-11-24 PROCEDURE — C9803 HOPD COVID-19 SPEC COLLECT: HCPCS

## 2021-11-24 PROCEDURE — U0003 INFECTIOUS AGENT DETECTION BY NUCLEIC ACID (DNA OR RNA); SEVERE ACUTE RESPIRATORY SYNDROME CORONAVIRUS 2 (SARS-COV-2) (CORONAVIRUS DISEASE [COVID-19]), AMPLIFIED PROBE TECHNIQUE, MAKING USE OF HIGH THROUGHPUT TECHNOLOGIES AS DESCRIBED BY CMS-2020-01-R: HCPCS | Mod: ZL

## 2021-11-26 LAB — SARS-COV-2 RNA RESP QL NAA+PROBE: POSITIVE

## 2021-12-04 ENCOUNTER — HEALTH MAINTENANCE LETTER (OUTPATIENT)
Age: 21
End: 2021-12-04

## 2021-12-14 ENCOUNTER — ALLIED HEALTH/NURSE VISIT (OUTPATIENT)
Dept: FAMILY MEDICINE | Facility: OTHER | Age: 21
End: 2021-12-14
Attending: PEDIATRICS
Payer: MEDICAID

## 2021-12-14 DIAGNOSIS — Z30.42 ENCOUNTER FOR DEPO-PROVERA CONTRACEPTION: Primary | ICD-10-CM

## 2021-12-14 PROCEDURE — 250N000011 HC RX IP 250 OP 636: Performed by: PEDIATRICS

## 2021-12-14 PROCEDURE — 96372 THER/PROPH/DIAG INJ SC/IM: CPT | Performed by: PEDIATRICS

## 2021-12-14 RX ADMIN — MEDROXYPROGESTERONE ACETATE 150 MG: 150 INJECTION, SUSPENSION INTRAMUSCULAR at 08:47

## 2021-12-14 NOTE — PROGRESS NOTES
Patient requests ongoing injections of Depo-Provera      Verified patient's first and last name, and . Patient stated reason for visit today is to receive a Depo Provera injection. Patient denied any concerns with previous injections.       LAST PAP/EXAM: No results found for: PAP  URINE HCG:not indicated    The following medication was given:   MEDICATION: Depo Provera 150mg  ROUTE: IM  SITE: Deltoid - Right    Depo Provera injection prepared and administered IM as ordered. Administration of medication documented in MAR (see MAR for further information regarding dose, lot #, NDC #, expiration date). Patient encouraged to wait in lobby for 15 minutes post-injection and notify staff immediately of any reaction. Next Depo Provera injection in series due NEXT INJECTION DUE: 3/1/22 - 3/15/22 . Patient provided appointment reminder card.       Skey Baldwin RN ....................  2021   8:41 AM

## 2021-12-28 ENCOUNTER — IMMUNIZATION (OUTPATIENT)
Dept: FAMILY MEDICINE | Facility: OTHER | Age: 21
End: 2021-12-28
Attending: PEDIATRICS
Payer: MEDICAID

## 2021-12-28 PROCEDURE — 91300 PR COVID VAC PFIZER DIL RECON 30 MCG/0.3 ML IM: CPT

## 2022-02-11 DIAGNOSIS — F91.3 OPPOSITIONAL DEFIANT DISORDER: ICD-10-CM

## 2022-02-11 DIAGNOSIS — F91.1 UNDERSOCIALIZED CONDUCT DISORDER, AGGRESSIVE TYPE: ICD-10-CM

## 2022-02-11 NOTE — TELEPHONE ENCOUNTER
" Disp Refills Start End MIRELA   QUEtiapine (SEROQUEL) 200 MG tablet 90 tablet 5 2/26/2021  No   Sig: TAKE 1 TABLET(200 MG) BY MOUTH THREE TIMES DAILY         LOV: 9/8/2020  Future Office visit: No future appointment scheduled at this time.      Routing refill request to provider for review/approval because:  Failed protocol  Due for an office visit.    Requested Prescriptions   Pending Prescriptions Disp Refills     QUEtiapine (SEROQUEL) 200 MG tablet [Pharmacy Med Name: QUETIAPINE 200MG TABLETS] 90 tablet 5     Sig: TAKE 1 TABLET(200 MG) BY MOUTH THREE TIMES DAILY       Antipsychotic Medications Failed - 2/11/2022  7:54 AM        Failed - Lipid panel on file within the past 12 months     Recent Labs   Lab Test 02/21/20  0953   CHOL 137   TRIG 89   HDL 36   LDL 83   NHDL 101               Failed - CBC on file in past 12 months     Recent Labs   Lab Test 06/07/16  1038   GICHWBC 8.6   GICHRBC 4.85   HGB 14.5   HCT 42.5                    Failed - Heart Rate on file within past 12 months     Pulse Readings from Last 3 Encounters:   09/08/20 88   03/11/20 84   03/01/20 94               Failed - A1c or Glucose on file in past 12 months     Recent Labs   Lab Test 02/21/20  0953   *   A1C 4.4       Please review patients last 3 weights. If a weight gain of >10 lbs exists, you may refill the prescription once after instructing the patient to schedule an appointment within the next 30 days.    Wt Readings from Last 3 Encounters:   09/15/21 77.1 kg (170 lb)   06/25/21 78.4 kg (172 lb 12.8 oz)   04/02/21 78 kg (172 lb)             Failed - Recent (6 mo) or future (30 days) visit within the authorizing provider's specialty     Patient had office visit in the last 6 months or has a visit in the next 30 days with authorizing provider or within the authorizing provider's specialty.  See \"Patient Info\" tab in inbasket, or \"Choose Columns\" in Meds & Orders section of the refill encounter.            Passed - Blood " pressure under 140/90 in past 12 months     BP Readings from Last 3 Encounters:   09/15/21 110/70   06/25/21 110/70   04/02/21 108/70                 Passed - Patient is 12 years of age or older        Passed - Medication is active on med list        Passed - Patient is not pregnant        Passed - No positve pregnancy test on file in past 12 months         Unable to complete prescription refill per RN Medication Refill Policy.................... Madalyn Armstrong RN ....................  2/11/2022   9:55 AM

## 2022-02-11 NOTE — TELEPHONE ENCOUNTER
Patient scheduled for 2/18/22 at 8am. Medication will run out 2/16/22. Patient mom wondering if they can be filled now.

## 2022-02-14 RX ORDER — QUETIAPINE FUMARATE 200 MG/1
TABLET, FILM COATED ORAL
Qty: 90 TABLET | Refills: 5 | Status: SHIPPED | OUTPATIENT
Start: 2022-02-14 | End: 2022-02-21

## 2022-02-21 ENCOUNTER — OFFICE VISIT (OUTPATIENT)
Dept: PEDIATRICS | Facility: OTHER | Age: 22
End: 2022-02-21
Attending: PEDIATRICS
Payer: MEDICAID

## 2022-02-21 VITALS
DIASTOLIC BLOOD PRESSURE: 72 MMHG | TEMPERATURE: 98 F | HEART RATE: 112 BPM | BODY MASS INDEX: 30.58 KG/M2 | WEIGHT: 172.6 LBS | RESPIRATION RATE: 24 BRPM | HEIGHT: 63 IN | OXYGEN SATURATION: 97 % | SYSTOLIC BLOOD PRESSURE: 110 MMHG

## 2022-02-21 DIAGNOSIS — L24.9 IRRITANT HAND DERMATITIS: ICD-10-CM

## 2022-02-21 DIAGNOSIS — G40.909 SEIZURE DISORDER (H): Primary | ICD-10-CM

## 2022-02-21 DIAGNOSIS — F91.1 UNDERSOCIALIZED CONDUCT DISORDER, AGGRESSIVE TYPE: ICD-10-CM

## 2022-02-21 DIAGNOSIS — N94.89 MENSTRUAL SUPPRESSION: ICD-10-CM

## 2022-02-21 DIAGNOSIS — F91.3 OPPOSITIONAL DEFIANT DISORDER: ICD-10-CM

## 2022-02-21 DIAGNOSIS — Z30.42 ENCOUNTER FOR MANAGEMENT AND INJECTION OF INJECTABLE PROGESTIN CONTRACEPTIVE: ICD-10-CM

## 2022-02-21 LAB
ALBUMIN SERPL-MCNC: 5 G/DL (ref 3.5–5.7)
ALP SERPL-CCNC: 42 U/L (ref 34–104)
ALT SERPL W P-5'-P-CCNC: 21 U/L (ref 7–52)
ANION GAP SERPL CALCULATED.3IONS-SCNC: 5 MMOL/L (ref 3–14)
AST SERPL W P-5'-P-CCNC: 25 U/L (ref 13–39)
BASOPHILS # BLD AUTO: 0 10E3/UL (ref 0–0.2)
BASOPHILS NFR BLD AUTO: 1 %
BILIRUB SERPL-MCNC: 0.5 MG/DL (ref 0.3–1)
BUN SERPL-MCNC: 17 MG/DL (ref 7–25)
CALCIUM SERPL-MCNC: 10.1 MG/DL (ref 8.6–10.3)
CHLORIDE BLD-SCNC: 106 MMOL/L (ref 98–107)
CO2 SERPL-SCNC: 27 MMOL/L (ref 21–31)
CREAT SERPL-MCNC: 0.92 MG/DL (ref 0.6–1.2)
EOSINOPHIL # BLD AUTO: 0.1 10E3/UL (ref 0–0.7)
EOSINOPHIL NFR BLD AUTO: 1 %
ERYTHROCYTE [DISTWIDTH] IN BLOOD BY AUTOMATED COUNT: 11.8 % (ref 10–15)
GFR SERPL CREATININE-BSD FRML MDRD: 90 ML/MIN/1.73M2
GLUCOSE BLD-MCNC: 86 MG/DL (ref 70–105)
HCT VFR BLD AUTO: 39.6 % (ref 35–47)
HGB BLD-MCNC: 13.6 G/DL (ref 11.7–15.7)
IMM GRANULOCYTES # BLD: 0 10E3/UL
IMM GRANULOCYTES NFR BLD: 0 %
LYMPHOCYTES # BLD AUTO: 2.8 10E3/UL (ref 0.8–5.3)
LYMPHOCYTES NFR BLD AUTO: 44 %
MCH RBC QN AUTO: 31.5 PG (ref 26.5–33)
MCHC RBC AUTO-ENTMCNC: 34.3 G/DL (ref 31.5–36.5)
MCV RBC AUTO: 92 FL (ref 78–100)
MONOCYTES # BLD AUTO: 0.6 10E3/UL (ref 0–1.3)
MONOCYTES NFR BLD AUTO: 9 %
NEUTROPHILS # BLD AUTO: 2.9 10E3/UL (ref 1.6–8.3)
NEUTROPHILS NFR BLD AUTO: 45 %
NRBC # BLD AUTO: 0 10E3/UL
NRBC BLD AUTO-RTO: 0 /100
PLATELET # BLD AUTO: 236 10E3/UL (ref 150–450)
POTASSIUM BLD-SCNC: 4.2 MMOL/L (ref 3.5–5.1)
PROT SERPL-MCNC: 7.1 G/DL (ref 6.4–8.9)
RBC # BLD AUTO: 4.32 10E6/UL (ref 3.8–5.2)
SODIUM SERPL-SCNC: 138 MMOL/L (ref 134–144)
WBC # BLD AUTO: 6.4 10E3/UL (ref 4–11)

## 2022-02-21 PROCEDURE — G0463 HOSPITAL OUTPT CLINIC VISIT: HCPCS

## 2022-02-21 PROCEDURE — 36415 COLL VENOUS BLD VENIPUNCTURE: CPT | Mod: ZL | Performed by: PEDIATRICS

## 2022-02-21 PROCEDURE — 85004 AUTOMATED DIFF WBC COUNT: CPT | Mod: ZL | Performed by: PEDIATRICS

## 2022-02-21 PROCEDURE — 80177 DRUG SCRN QUAN LEVETIRACETAM: CPT | Mod: ZL | Performed by: PEDIATRICS

## 2022-02-21 PROCEDURE — 99214 OFFICE O/P EST MOD 30 MIN: CPT | Performed by: PEDIATRICS

## 2022-02-21 PROCEDURE — 80175 DRUG SCREEN QUAN LAMOTRIGINE: CPT | Mod: ZL | Performed by: PEDIATRICS

## 2022-02-21 PROCEDURE — 80053 COMPREHEN METABOLIC PANEL: CPT | Mod: ZL | Performed by: PEDIATRICS

## 2022-02-21 RX ORDER — QUETIAPINE FUMARATE 200 MG/1
TABLET, FILM COATED ORAL
Qty: 90 TABLET | Refills: 5 | Status: SHIPPED | OUTPATIENT
Start: 2022-02-21 | End: 2022-12-14

## 2022-02-21 RX ORDER — LAMOTRIGINE 200 MG/1
1.5 TABLET, EXTENDED RELEASE ORAL 2 TIMES DAILY
Qty: 90 TABLET | Refills: 1 | Status: SHIPPED | OUTPATIENT
Start: 2022-02-21

## 2022-02-21 RX ORDER — LEVETIRACETAM 500 MG/1
TABLET ORAL
Qty: 120 TABLET | Refills: 5 | Status: SHIPPED | OUTPATIENT
Start: 2022-02-21

## 2022-02-21 RX ORDER — MEDROXYPROGESTERONE ACETATE 150 MG/ML
150 INJECTION, SUSPENSION INTRAMUSCULAR
Qty: 1 ML | Refills: 3 | OUTPATIENT
Start: 2022-03-08 | End: 2024-05-31

## 2022-02-21 RX ORDER — EMOLLIENT BASE
CREAM (GRAM) TOPICAL 2 TIMES DAILY PRN
Qty: 453 G | Refills: 3 | Status: SHIPPED | OUTPATIENT
Start: 2022-02-21

## 2022-02-21 RX ORDER — MULTIPLE VITAMINS W/ MINERALS TAB 9MG-400MCG
1 TAB ORAL DAILY
Qty: 100 TABLET | Refills: 3 | Status: SHIPPED | OUTPATIENT
Start: 2022-02-21 | End: 2024-05-31

## 2022-02-21 RX ORDER — CLONAZEPAM 0.5 MG/1
TABLET ORAL
Qty: 40 TABLET | Refills: 2 | Status: SHIPPED | OUTPATIENT
Start: 2022-02-21

## 2022-02-21 RX ORDER — VITAMIN B COMPLEX
1 TABLET ORAL DAILY
Qty: 100 TABLET | Refills: 3 | Status: SHIPPED | OUTPATIENT
Start: 2022-02-21 | End: 2022-06-03

## 2022-02-21 RX ORDER — CLONIDINE HYDROCHLORIDE 0.1 MG/1
TABLET ORAL
Qty: 45 TABLET | Refills: 5 | Status: SHIPPED | OUTPATIENT
Start: 2022-02-21 | End: 2022-09-26

## 2022-02-21 ASSESSMENT — ENCOUNTER SYMPTOMS
FEVER: 0
FATIGUE: 0
SEIZURES: 0
ACTIVITY CHANGE: 0
SLEEP DISTURBANCE: 0
ROS GI COMMENTS: TOILET TRAINED
ROS SKIN COMMENTS: DANDRUFF

## 2022-02-21 ASSESSMENT — PAIN SCALES - GENERAL: PAINLEVEL: NO PAIN (0)

## 2022-02-21 NOTE — PATIENT INSTRUCTIONS
Plan to follow up with Dr. Christa Mata.     The current medical regimen is effective;  continue present plan and medications.

## 2022-02-21 NOTE — NURSING NOTE
Pt here with Analia for a med check.  Emelina Fowler CMA ([facility]AMA)......................2/21/2022  8:41 AM       Medication Reconciliation: complete    Emelina Fowler CMA  2/21/2022 8:41 AM

## 2022-02-21 NOTE — Clinical Note
Fadia would like to start seeing you now that she is 21. Signed by Krista Bowman MD .....2/21/2022 11:01 AM

## 2022-02-21 NOTE — PROGRESS NOTES
ICD-10-CM    1. Seizure disorder (H)  G40.909 Comprehensive Metabolic Panel     CBC and Differential     Keppra (Levetiracetam) Level     Lamotrigine Level     Lamotrigine Level     Keppra (Levetiracetam) Level     CBC and Differential     Comprehensive Metabolic Panel   2. Oppositional defiant disorder  F91.3 clonazePAM (KLONOPIN) 0.5 MG tablet     cloNIDine (CATAPRES) 0.1 MG tablet     QUEtiapine (SEROQUEL) 200 MG tablet   3. Undersocialized conduct disorder, aggressive type  F91.1 clonazePAM (KLONOPIN) 0.5 MG tablet     cloNIDine (CATAPRES) 0.1 MG tablet     QUEtiapine (SEROQUEL) 200 MG tablet   4. Encounter for management and injection of injectable progestin contraceptive  Z30.42 medroxyPROGESTERone (DEPO-PROVERA) 150 MG/ML IM injection     multivitamin w/minerals (MULTI-VITAMIN) tablet     Vitamin D3 (CHOLECALCIFEROL) 25 mcg (1000 units) tablet   5. Irritant hand dermatitis  L24.9 emollient (VANICREAM) external cream    Triamcinolone cream and lotion recommended.    6. Menstrual suppression  N94.89      Initial labs are reassuring.  The current medical regimen is effective;  continue present plan and medications. Will restart shampoo from salon for dandruff.    Discussed transitioning to adult medicine.  Soni would like to see Dr. Lou Mata.     Time spent was at least 35 minutes, in history taking, record review, exam, counseling and documentation.    Terrence Loaiza is a 21 year old with epilepsy and developmental delay who presents for the following health issues  accompanied by Analia, her godmother.    History of Present Illness     Reason for visit:  Check up for meds    She eats 2-3 servings of fruits and vegetables daily.She consumes 0 sweetened beverage(s) daily.She exercises with enough effort to increase her heart rate 20 to 29 minutes per day.  She exercises with enough effort to increase her heart rate 4 days per week.   She is taking medications regularly.    Soni comes in for  "medication refills and yearly labs.  She has an appointment with her neurologist tomorrow, Dr. Nguyen.  She hasn't had any seizures in the last 6 years.  They haven't discontinued any medications yet because when they did a few years ago her behaviors hunter rocketed.      Soni continues on her Depo-Provera.  She has a very difficult time dealing with menstrual  bleeding. She has seen OB and they recommended that she continue this for the benefit of amenorrhea.   She is taking a multivitamin, vitamin D and vitamin b6.        Soc: Fadia will be transitioning to adult medicine.  She would like to start seeing Dr. Lee.   She has guardianship with Keke, Jarred and Analia.  She will be graduating this year from high school.   She will take the summer off to do some girl  canoe trips and then she will try to get into the first choice program.         Review of Systems   Constitutional: Negative for activity change, fatigue and fever.   HENT: Negative for congestion.    Gastrointestinal:        Toilet trained   Genitourinary:        Amenorrhea   Skin:        dandruff   Neurological: Negative for seizures.   Psychiatric/Behavioral: Negative for sleep disturbance.            Objective    /72 (BP Location: Right arm, Patient Position: Sitting, Cuff Size: Adult Regular)   Pulse 112   Temp 98  F (36.7  C) (Tympanic)   Resp 24   Ht 5' 3\" (1.6 m)   Wt 172 lb 9.6 oz (78.3 kg)   SpO2 97%   BMI 30.57 kg/m    Body mass index is 30.57 kg/m .  Physical Exam   GENERAL: healthy, alert and no distress  EYES: Eyes grossly normal to inspection, PERRL and conjunctivae and sclerae normal  HENT: ear canals and TM's normal, nose and mouth without ulcers or lesions  NECK: no adenopathy, no asymmetry, masses, or scars and thyroid normal to palpation  RESP: lungs clear to auscultation - no rales, rhonchi or wheezes  BREAST: normal without masses, tenderness or nipple discharge and no palpable axillary masses or " adenopathy  CV: regular rate and rhythm, normal S1 S2, no S3 or S4, no murmur, click or rub, no peripheral edema and peripheral pulses strong  ABDOMEN: soft, nontender, no hepatosplenomegaly, no masses and bowel sounds normal  MS: no gross musculoskeletal defects noted, no edema  SKIN:seborrheic dermatitis, hands are under good control.   NEURO: Normal strength and tone, mentation intact and speech normal  PSYCH: mentation appears normal, affect normal/bright    Results for orders placed or performed in visit on 02/21/22   Comprehensive Metabolic Panel     Status: Normal   Result Value Ref Range    Sodium 138 134 - 144 mmol/L    Potassium 4.2 3.5 - 5.1 mmol/L    Chloride 106 98 - 107 mmol/L    Carbon Dioxide (CO2) 27 21 - 31 mmol/L    Anion Gap 5 3 - 14 mmol/L    Urea Nitrogen 17 7 - 25 mg/dL    Creatinine 0.92 0.60 - 1.20 mg/dL    Calcium 10.1 8.6 - 10.3 mg/dL    Glucose 86 70 - 105 mg/dL    Alkaline Phosphatase 42 34 - 104 U/L    AST 25 13 - 39 U/L    ALT 21 7 - 52 U/L    Protein Total 7.1 6.4 - 8.9 g/dL    Albumin 5.0 3.5 - 5.7 g/dL    Bilirubin Total 0.5 0.3 - 1.0 mg/dL    GFR Estimate 90 >60 mL/min/1.73m2   CBC with platelets and differential     Status: None   Result Value Ref Range    WBC Count 6.4 4.0 - 11.0 10e3/uL    RBC Count 4.32 3.80 - 5.20 10e6/uL    Hemoglobin 13.6 11.7 - 15.7 g/dL    Hematocrit 39.6 35.0 - 47.0 %    MCV 92 78 - 100 fL    MCH 31.5 26.5 - 33.0 pg    MCHC 34.3 31.5 - 36.5 g/dL    RDW 11.8 10.0 - 15.0 %    Platelet Count 236 150 - 450 10e3/uL    % Neutrophils 45 %    % Lymphocytes 44 %    % Monocytes 9 %    % Eosinophils 1 %    % Basophils 1 %    % Immature Granulocytes 0 %    NRBCs per 100 WBC 0 <1 /100    Absolute Neutrophils 2.9 1.6 - 8.3 10e3/uL    Absolute Lymphocytes 2.8 0.8 - 5.3 10e3/uL    Absolute Monocytes 0.6 0.0 - 1.3 10e3/uL    Absolute Eosinophils 0.1 0.0 - 0.7 10e3/uL    Absolute Basophils 0.0 0.0 - 0.2 10e3/uL    Absolute Immature Granulocytes 0.0 <=0.4 10e3/uL     Absolute NRBCs 0.0 10e3/uL   CBC and Differential     Status: None    Narrative    The following orders were created for panel order CBC and Differential.  Procedure                               Abnormality         Status                     ---------                               -----------         ------                     CBC with platelets and d...[[id number]]                      Final result                 Please view results for these tests on the individual orders.

## 2022-02-24 ENCOUNTER — TELEPHONE (OUTPATIENT)
Dept: PEDIATRICS | Facility: OTHER | Age: 22
End: 2022-02-24
Payer: MEDICAID

## 2022-02-24 NOTE — TELEPHONE ENCOUNTER
Received denial from [place] [employer] for Thera-M Plus Tablets and lamoTRIgine ER 200MG er tablets. Faxed the denial to the refill nurses

## 2022-02-25 NOTE — TELEPHONE ENCOUNTER
We will hear back if there is any change in medication to be made.  The refill nurses will process the prior authorization for the medication denial.    Kasia Crane LPN............2/25/2022 9:28 AM

## 2022-02-27 LAB
LAMOTRIGINE SERPL-MCNC: 10.3 UG/ML
LEVETIRACETAM SERPL-MCNC: 34 UG/ML

## 2022-03-11 ENCOUNTER — ALLIED HEALTH/NURSE VISIT (OUTPATIENT)
Dept: FAMILY MEDICINE | Facility: OTHER | Age: 22
End: 2022-03-11
Attending: STUDENT IN AN ORGANIZED HEALTH CARE EDUCATION/TRAINING PROGRAM
Payer: MEDICAID

## 2022-03-11 VITALS — DIASTOLIC BLOOD PRESSURE: 88 MMHG | SYSTOLIC BLOOD PRESSURE: 128 MMHG

## 2022-03-11 DIAGNOSIS — Z30.42 ENCOUNTER FOR DEPO-PROVERA CONTRACEPTION: Primary | ICD-10-CM

## 2022-03-11 PROCEDURE — 96372 THER/PROPH/DIAG INJ SC/IM: CPT | Performed by: PEDIATRICS

## 2022-03-11 PROCEDURE — 250N000011 HC RX IP 250 OP 636: Performed by: PEDIATRICS

## 2022-03-11 RX ADMIN — MEDROXYPROGESTERONE ACETATE 150 MG: 150 INJECTION, SUSPENSION INTRAMUSCULAR at 08:34

## 2022-03-11 NOTE — PROGRESS NOTES
Clinic Administered Medication Documentation     Depo Provera Documentation     URINE HCG:not indicated     Depo-Provera Standing Order inclusion/exclusion criteria reviewed.   Patient meets: inclusion criteria    BP: 128/88  LAST PAP/EXAM: No results found for: PAP    Prior to injection, verified patient identity using patient's name and date of birth. Medication was administered. Please see MAR and medication order for additional information.     Was entire vial of medication used? Yes  Vial/Syringe: Single dose vial  Expiration Date:  09/01/2023    Patient instructed to remain in clinic for 15 minutes and report any adverse reaction to staff immediately .  NEXT INJECTION DUE: 5/27/22 - 6/10/22    Jay Lemus RN, BSN  ....................  3/11/2022   8:35 AM

## 2022-04-29 RX ORDER — MULTIVITAMIN WITH IRON
TABLET ORAL
Qty: 100 TABLET | Refills: 4 | OUTPATIENT
Start: 2022-04-29

## 2022-04-29 NOTE — TELEPHONE ENCOUNTER
Redundant refill request refused: Too soon:     pyridOXINE (VITAMIN B6) 100 MG TABS 100 tablet 4 2/21/2022  No   Sig: TAKE 1.5 TABLETS BY MOUTH EVERY MORNING AND 1/2 TABLET BY MOUTH EVERY EVENING   Sent to pharmacy as: Pyridoxine HCl 100 MG Oral Tablet (VITAMIN B6)   Class: E-Prescribe   Order: [id number]   E-Prescribing Status: Receipt confirmed by pharmacy (2/21/2022  9:12 AM CST)     [id number] - [place], [place] - [address]     Kasia Beckford RN .............. 4/29/2022  2:03 PM

## 2022-06-03 ENCOUNTER — ALLIED HEALTH/NURSE VISIT (OUTPATIENT)
Dept: FAMILY MEDICINE | Facility: OTHER | Age: 22
End: 2022-06-03
Attending: STUDENT IN AN ORGANIZED HEALTH CARE EDUCATION/TRAINING PROGRAM
Payer: MEDICAID

## 2022-06-03 VITALS
OXYGEN SATURATION: 97 % | HEART RATE: 94 BPM | TEMPERATURE: 97.5 F | RESPIRATION RATE: 18 BRPM | SYSTOLIC BLOOD PRESSURE: 124 MMHG | WEIGHT: 171 LBS | BODY MASS INDEX: 30.29 KG/M2 | DIASTOLIC BLOOD PRESSURE: 80 MMHG

## 2022-06-03 DIAGNOSIS — Z23 HIGH PRIORITY FOR 2019-NCOV VACCINE: ICD-10-CM

## 2022-06-03 DIAGNOSIS — R04.0 EPISTAXIS: ICD-10-CM

## 2022-06-03 DIAGNOSIS — Z00.00 HEALTH CARE MAINTENANCE: Primary | ICD-10-CM

## 2022-06-03 DIAGNOSIS — Z30.42 ENCOUNTER FOR MANAGEMENT AND INJECTION OF INJECTABLE PROGESTIN CONTRACEPTIVE: ICD-10-CM

## 2022-06-03 DIAGNOSIS — Z30.42 ENCOUNTER FOR DEPO-PROVERA CONTRACEPTION: Primary | ICD-10-CM

## 2022-06-03 PROCEDURE — 250N000011 HC RX IP 250 OP 636: Performed by: PEDIATRICS

## 2022-06-03 PROCEDURE — G0463 HOSPITAL OUTPT CLINIC VISIT: HCPCS | Mod: 25

## 2022-06-03 PROCEDURE — 0054A COVID-19,PF,PFIZER (12+ YRS): CPT

## 2022-06-03 PROCEDURE — 96372 THER/PROPH/DIAG INJ SC/IM: CPT | Performed by: PEDIATRICS

## 2022-06-03 PROCEDURE — G0463 HOSPITAL OUTPT CLINIC VISIT: HCPCS

## 2022-06-03 PROCEDURE — 99213 OFFICE O/P EST LOW 20 MIN: CPT | Performed by: STUDENT IN AN ORGANIZED HEALTH CARE EDUCATION/TRAINING PROGRAM

## 2022-06-03 RX ORDER — MULTIVITAMIN
1 TABLET ORAL DAILY
Qty: 90 TABLET | Refills: 3 | Status: SHIPPED | OUTPATIENT
Start: 2022-06-03

## 2022-06-03 RX ORDER — VITAMIN B COMPLEX
1 TABLET ORAL DAILY
Qty: 100 TABLET | Refills: 3 | Status: SHIPPED | OUTPATIENT
Start: 2022-06-03 | End: 2024-05-31

## 2022-06-03 RX ADMIN — MEDROXYPROGESTERONE ACETATE 150 MG: 150 INJECTION, SUSPENSION INTRAMUSCULAR at 08:34

## 2022-06-03 ASSESSMENT — PAIN SCALES - GENERAL: PAINLEVEL: NO PAIN (0)

## 2022-06-03 NOTE — PROGRESS NOTES
Assessment & Plan     Health care maintenance  Rx sent for multi vitamin and vitamin D   - Vitamin D3 (CHOLECALCIFEROL) 25 mcg (1000 units) tablet; Take 1 tablet (25 mcg) by mouth daily  - multivitamin (ONE-DAILY) tablet; Take 1 tablet by mouth daily If covered by insurance/generic ok    Epistaxis  Advised to not pick nose. Discussed other things she can do when upset (walk, bike, kick a ball, be outside). Discussed epistaxis management       Luis Armando Mata MD  [facility]    Terrence Loaiza is a 21 year old who presents for the following health issues  accompanied by her Guardian Nhi.    History of Present Illness       Reason for visit:  Meet the new doctor and meds    She eats 4 or more servings of fruits and vegetables daily.She consumes 1 sweetened beverage(s) daily.She exercises with enough effort to increase her heart rate 20 to 29 minutes per day.  She exercises with enough effort to increase her heart rate 4 days per week.   She is taking medications regularly.     Here today to establish care  Was previously following with her pediatrician and now is transitioning to adult care   Needs Rx for vitamin D and multivitamin sent   Had her covid booster today and depo injection   Picks her nose when she is upset then ends up with bloody nose    Seroquel, clonidine, b6, vitamin d        Review of Systems   Constitutional, HEENT, cardiovascular, pulmonary, gi and gu systems are negative, except as otherwise noted.      Objective    /80 (BP Location: Right arm, Patient Position: Sitting, Cuff Size: Adult Regular)   Pulse 94   Temp 97.5  F (36.4  C) (Tympanic)   Resp 18   Wt 77.6 kg (171 lb)   LMP  (LMP Unknown)   SpO2 97%   Breastfeeding No   BMI 30.29 kg/m    Body mass index is 30.29 kg/m .  Physical Exam   GENERAL: healthy, alert and no distress  NEURO: Normal strength and tone, repeating words   PSYCH: normal affect/bright

## 2022-06-03 NOTE — PROGRESS NOTES
Patient requests ongoing injections of Depo-Provera      Verified patient's first and last name, and . Patient stated reason for visit today is to receive a Depo Provera injection. Patient denied any concerns with previous injections.     LAST PAP/EXAM: No results found for: PAP  URINE HCG:not indicated    The following medication was given:   MEDICATION: Depo Provera 150mg  ROUTE: IM  SITE: Deltoid - Right    Depo Provera injection prepared and administered IM as ordered. Administration of medication documented in MAR (see MAR for further information regarding dose, lot #, NDC #, expiration date). Patient encouraged to wait in lobby for 15 minutes post-injection and notify staff immediately of any reaction. Next Depo Provera injection in series due NEXT INJECTION DUE: 22 - 22 . Patient provided appointment reminder card.       Covid vaccination booster administered today.    Skye Baldwin RN ....................  6/3/2022   8:33 AM

## 2022-06-03 NOTE — NURSING NOTE
Patient presents to clinic with her guardian Nhi for medication follow up.    Medication Rec Complete  Kasia Crane LPN............6/3/2022 10:26 AM

## 2022-08-24 DIAGNOSIS — Z30.013 ENCOUNTER FOR INITIAL PRESCRIPTION OF INJECTABLE CONTRACEPTIVE: Primary | ICD-10-CM

## 2022-08-24 RX ORDER — MEDROXYPROGESTERONE ACETATE 150 MG/ML
150 INJECTION, SUSPENSION INTRAMUSCULAR
Status: DISCONTINUED | OUTPATIENT
Start: 2022-08-24 | End: 2023-11-02

## 2022-08-24 NOTE — PROGRESS NOTES
Pt is coming in Friday for Depo injection. Pt needs updated orders. Medication T'ed up in chart. Please sign. Thank you     Sarah Osorio RN on 8/24/2022 at 2:09 PM

## 2022-08-31 ENCOUNTER — ALLIED HEALTH/NURSE VISIT (OUTPATIENT)
Dept: FAMILY MEDICINE | Facility: OTHER | Age: 22
End: 2022-08-31
Attending: STUDENT IN AN ORGANIZED HEALTH CARE EDUCATION/TRAINING PROGRAM
Payer: MEDICAID

## 2022-08-31 DIAGNOSIS — Z30.42 ENCOUNTER FOR DEPO-PROVERA CONTRACEPTION: Primary | ICD-10-CM

## 2022-08-31 PROCEDURE — 250N000011 HC RX IP 250 OP 636: Performed by: STUDENT IN AN ORGANIZED HEALTH CARE EDUCATION/TRAINING PROGRAM

## 2022-08-31 PROCEDURE — 96372 THER/PROPH/DIAG INJ SC/IM: CPT | Performed by: STUDENT IN AN ORGANIZED HEALTH CARE EDUCATION/TRAINING PROGRAM

## 2022-08-31 RX ADMIN — MEDROXYPROGESTERONE ACETATE 150 MG: 150 INJECTION, SUSPENSION INTRAMUSCULAR at 07:50

## 2022-08-31 NOTE — PROGRESS NOTES
LAST PAP/EXAM: No results found for: PAP  URINE HCG:not indicated    The following medication was given:     MEDICATION: Depo Provera 150mg  ROUTE: IM  SITE: Deltoid - Right  LOT #: [id number]  EXP:04/01/2024  NEXT INJECTION DUE: 11/16/22 - 11/30/22   Provider: JOVON Osorio RN on 8/31/2022 at 7:50 AM

## 2022-09-17 ENCOUNTER — HEALTH MAINTENANCE LETTER (OUTPATIENT)
Age: 22
End: 2022-09-17

## 2022-09-23 DIAGNOSIS — F91.3 OPPOSITIONAL DEFIANT DISORDER: ICD-10-CM

## 2022-09-23 DIAGNOSIS — F91.1 UNDERSOCIALIZED CONDUCT DISORDER, AGGRESSIVE TYPE: ICD-10-CM

## 2022-09-26 RX ORDER — CLONIDINE HYDROCHLORIDE 0.1 MG/1
TABLET ORAL
Qty: 135 TABLET | Refills: 0 | Status: SHIPPED | OUTPATIENT
Start: 2022-09-26 | End: 2022-12-23

## 2022-09-26 NOTE — TELEPHONE ENCOUNTER
[facility] sent Rx request for the following:      Requested Prescriptions   Pending Prescriptions Disp Refills     cloNIDine (CATAPRES) 0.1 MG tablet [Pharmacy Med Name: CLONIDINE 0.1MG TABLETS] 45 tablet 5     Sig: TAKE 1/2 TABLET(0.05 MG) BY MOUTH THREE TIMES DAILY       Antiadrenergic Antihypertensives Failed - 9/26/2022 11:32 AM        Failed - Recent (6 mo) or future (30 days) visit within the authorizing provider's specialty     Last Prescription Date:   2/21/22  Last Fill Qty/Refills:         45, R-5    Last Office Visit:              6/3/22   Future Office visit:           None    Kasia Beckford RN .............. 9/26/2022  11:34 AM

## 2022-11-28 ENCOUNTER — ALLIED HEALTH/NURSE VISIT (OUTPATIENT)
Dept: FAMILY MEDICINE | Facility: OTHER | Age: 22
End: 2022-11-28
Attending: STUDENT IN AN ORGANIZED HEALTH CARE EDUCATION/TRAINING PROGRAM
Payer: MEDICAID

## 2022-11-28 DIAGNOSIS — Z30.42 ENCOUNTER FOR DEPO-PROVERA CONTRACEPTION: Primary | ICD-10-CM

## 2022-11-28 PROCEDURE — 250N000011 HC RX IP 250 OP 636: Performed by: STUDENT IN AN ORGANIZED HEALTH CARE EDUCATION/TRAINING PROGRAM

## 2022-11-28 PROCEDURE — 96372 THER/PROPH/DIAG INJ SC/IM: CPT | Performed by: STUDENT IN AN ORGANIZED HEALTH CARE EDUCATION/TRAINING PROGRAM

## 2022-11-28 RX ADMIN — MEDROXYPROGESTERONE ACETATE 150 MG: 150 INJECTION, SUSPENSION INTRAMUSCULAR at 07:50

## 2022-11-28 NOTE — PROGRESS NOTES
LAST PAP/EXAM: No results found for: PAP  URINE HCG:not indicated    The following medication was given:     MEDICATION: Depo Provera 150mg  ROUTE: IM  SITE: Deltoid - Left  LOT #: [id number]  EXP:04/01/2024  NEXT INJECTION DUE: 2/13/23 - 2/27/23   Provider: JOVON Hamilton RN on 11/28/2022 at 7:52 AM

## 2022-12-12 DIAGNOSIS — F91.3 OPPOSITIONAL DEFIANT DISORDER: ICD-10-CM

## 2022-12-12 DIAGNOSIS — F91.1 UNDERSOCIALIZED CONDUCT DISORDER, AGGRESSIVE TYPE: ICD-10-CM

## 2022-12-14 RX ORDER — QUETIAPINE FUMARATE 200 MG/1
TABLET, FILM COATED ORAL
Qty: 90 TABLET | Refills: 5 | Status: SHIPPED | OUTPATIENT
Start: 2022-12-14 | End: 2023-06-09

## 2022-12-23 DIAGNOSIS — F91.1 UNDERSOCIALIZED CONDUCT DISORDER, AGGRESSIVE TYPE: ICD-10-CM

## 2022-12-23 DIAGNOSIS — F91.3 OPPOSITIONAL DEFIANT DISORDER: ICD-10-CM

## 2022-12-23 RX ORDER — CLONIDINE HYDROCHLORIDE 0.1 MG/1
TABLET ORAL
Qty: 135 TABLET | Refills: 5 | Status: SHIPPED | OUTPATIENT
Start: 2022-12-23 | End: 2023-10-12

## 2022-12-23 NOTE — TELEPHONE ENCOUNTER
"  Last Prescription Date: 9/26/22  Last Qty/Refills: 135 / 0  Last Office Visit: 6/3/22 Lou Mata  Future Office Visit: none     Requested Prescriptions   Pending Prescriptions Disp Refills     cloNIDine (CATAPRES) 0.1 MG tablet 135 tablet 0     Sig: TAKE 1/2 TABLET(0.05 MG) BY MOUTH THREE TIMES DAILY       Antiadrenergic Antihypertensives Failed - 12/23/2022  2:39 PM        Failed - Blood pressure less than 140/90 in past 6 months     BP Readings from Last 3 Encounters:   06/03/22 124/80   03/11/22 128/88   02/21/22 110/72                 Failed - Recent (6 mo) or future (30 days) visit within the authorizing provider's specialty     Patient had office visit in the last 6 months or has a visit in the next 30 days with authorizing provider or within the authorizing provider's specialty.  See \"Patient Info\" tab in inbasket, or \"Choose Columns\" in Meds & Orders section of the refill encounter.            Passed - Medication is active on med list        Passed - Patient is age 18 or older        Passed - No active pregnancy on record        Passed - Normal serum creatinine on file in past 12 months     Recent Labs   Lab Test 02/21/22  0914   CR 0.92       Ok to refill medication if creatinine is low          Passed - No positive pregnancy test within past 12 months       Central Acting Antiadrenergic Agents Passed - 12/23/2022  2:39 PM        Passed - Blood pressure under 140/90 in past 12 months     BP Readings from Last 3 Encounters:   06/03/22 124/80   03/11/22 128/88   02/21/22 110/72                 Passed - Patient is 6 years of age or older        Passed - Recent (12 mo) or future (30 days) visit within the authorizing provider's specialty     Patient has had an office visit with the authorizing provider or a provider within the authorizing providers department within the previous 12 mos or has a future within next 30 days. See \"Patient Info\" tab in [employer], or \"Choose Columns\" in Meds & Orders section of " the refill encounter.              Passed - Medication is active on med list        Passed - Patient not pregnant        Passed - Normal serum creatinine on file within past 12 months     Recent Labs   Lab Test 02/21/22  0914   CR 0.92       Ok to refill medication if creatinine is low          Passed - No positive pregnancy test on file in past 12 months

## 2023-01-13 DIAGNOSIS — Z00.00 HEALTH CARE MAINTENANCE: Primary | ICD-10-CM

## 2023-01-17 NOTE — TELEPHONE ENCOUNTER
[facility] #[id number] - sent Rx request for the following:      Requested Prescriptions   Pending Prescriptions Disp Refills     vitamin B6 (PYRIDOXINE) 100 MG tablet [Pharmacy Med Name: VITAMIN B6 100MG TABLETS 100'S] 100 tablet 4     Sig: TAKE 1.5 TABLETS BY MOUTH EVERY MORNING THEN TAKE 1/2 TABLET BY MOUTH EVERY EVENING       There is no refill protocol information for this order        Last Prescription Date:   02/21/22  Last Fill Qty/Refills:         100, R-4  Last Office Visit:              06/03/22   Future Office visit:           None    Juliane Vaz RN on 1/17/2023 at 2:35 PM

## 2023-01-18 RX ORDER — MULTIVITAMIN WITH IRON
TABLET ORAL
Qty: 100 TABLET | Refills: 4 | Status: SHIPPED | OUTPATIENT
Start: 2023-01-18 | End: 2023-09-20

## 2023-01-28 ENCOUNTER — HEALTH MAINTENANCE LETTER (OUTPATIENT)
Age: 23
End: 2023-01-28

## 2023-02-13 ENCOUNTER — OFFICE VISIT (OUTPATIENT)
Dept: FAMILY MEDICINE | Facility: OTHER | Age: 23
End: 2023-02-13
Attending: STUDENT IN AN ORGANIZED HEALTH CARE EDUCATION/TRAINING PROGRAM
Payer: MEDICAID

## 2023-02-13 VITALS
HEART RATE: 111 BPM | OXYGEN SATURATION: 98 % | RESPIRATION RATE: 20 BRPM | BODY MASS INDEX: 30.56 KG/M2 | WEIGHT: 172.5 LBS | DIASTOLIC BLOOD PRESSURE: 74 MMHG | HEIGHT: 63 IN | SYSTOLIC BLOOD PRESSURE: 128 MMHG | TEMPERATURE: 97.6 F

## 2023-02-13 DIAGNOSIS — Z00.00 ROUTINE GENERAL MEDICAL EXAMINATION AT A HEALTH CARE FACILITY: Primary | ICD-10-CM

## 2023-02-13 DIAGNOSIS — R68.2 DRY MOUTH: ICD-10-CM

## 2023-02-13 DIAGNOSIS — Z30.42 ENCOUNTER FOR MANAGEMENT AND INJECTION OF DEPO-PROVERA: ICD-10-CM

## 2023-02-13 LAB — HCG UR QL: NEGATIVE

## 2023-02-13 PROCEDURE — G0123 SCREEN CERV/VAG THIN LAYER: HCPCS | Performed by: STUDENT IN AN ORGANIZED HEALTH CARE EDUCATION/TRAINING PROGRAM

## 2023-02-13 PROCEDURE — 250N000011 HC RX IP 250 OP 636: Performed by: STUDENT IN AN ORGANIZED HEALTH CARE EDUCATION/TRAINING PROGRAM

## 2023-02-13 PROCEDURE — 91312 COVID-19 VACCINE BIVALENT BOOSTER 12+ (PFIZER): CPT

## 2023-02-13 PROCEDURE — 81025 URINE PREGNANCY TEST: CPT | Mod: ZL | Performed by: STUDENT IN AN ORGANIZED HEALTH CARE EDUCATION/TRAINING PROGRAM

## 2023-02-13 PROCEDURE — 99395 PREV VISIT EST AGE 18-39: CPT | Performed by: STUDENT IN AN ORGANIZED HEALTH CARE EDUCATION/TRAINING PROGRAM

## 2023-02-13 PROCEDURE — G0463 HOSPITAL OUTPT CLINIC VISIT: HCPCS | Mod: 25

## 2023-02-13 PROCEDURE — 99213 OFFICE O/P EST LOW 20 MIN: CPT | Mod: 25 | Performed by: STUDENT IN AN ORGANIZED HEALTH CARE EDUCATION/TRAINING PROGRAM

## 2023-02-13 PROCEDURE — 0124A COVID-19 VACCINE BIVALENT BOOSTER 12+ (PFIZER): CPT

## 2023-02-13 PROCEDURE — 96372 THER/PROPH/DIAG INJ SC/IM: CPT | Performed by: STUDENT IN AN ORGANIZED HEALTH CARE EDUCATION/TRAINING PROGRAM

## 2023-02-13 RX ADMIN — MEDROXYPROGESTERONE ACETATE 150 MG: 150 INJECTION, SUSPENSION INTRAMUSCULAR at 09:40

## 2023-02-13 ASSESSMENT — ANXIETY QUESTIONNAIRES
GAD7 TOTAL SCORE: 0
2. NOT BEING ABLE TO STOP OR CONTROL WORRYING: NOT AT ALL
8. IF YOU CHECKED OFF ANY PROBLEMS, HOW DIFFICULT HAVE THESE MADE IT FOR YOU TO DO YOUR WORK, TAKE CARE OF THINGS AT HOME, OR GET ALONG WITH OTHER PEOPLE?: NOT DIFFICULT AT ALL
7. FEELING AFRAID AS IF SOMETHING AWFUL MIGHT HAPPEN: NOT AT ALL
1. FEELING NERVOUS, ANXIOUS, OR ON EDGE: NOT AT ALL
GAD7 TOTAL SCORE: 0
3. WORRYING TOO MUCH ABOUT DIFFERENT THINGS: NOT AT ALL
4. TROUBLE RELAXING: NOT AT ALL
IF YOU CHECKED OFF ANY PROBLEMS ON THIS QUESTIONNAIRE, HOW DIFFICULT HAVE THESE PROBLEMS MADE IT FOR YOU TO DO YOUR WORK, TAKE CARE OF THINGS AT HOME, OR GET ALONG WITH OTHER PEOPLE: NOT DIFFICULT AT ALL
5. BEING SO RESTLESS THAT IT IS HARD TO SIT STILL: NOT AT ALL
6. BECOMING EASILY ANNOYED OR IRRITABLE: NOT AT ALL
7. FEELING AFRAID AS IF SOMETHING AWFUL MIGHT HAPPEN: NOT AT ALL
GAD7 TOTAL SCORE: 0

## 2023-02-13 ASSESSMENT — ENCOUNTER SYMPTOMS
ABDOMINAL PAIN: 0
EYE PAIN: 0
CHILLS: 0
SHORTNESS OF BREATH: 0
CONSTIPATION: 0
PARESTHESIAS: 0
HEADACHES: 0
HEARTBURN: 0
DIARRHEA: 0
DIZZINESS: 0
DYSURIA: 0
ARTHRALGIAS: 0
MYALGIAS: 0
FREQUENCY: 0
COUGH: 0
BREAST MASS: 0
HEMATURIA: 0
HEMATOCHEZIA: 0
FEVER: 0
NAUSEA: 0
SORE THROAT: 0
WEAKNESS: 0
PALPITATIONS: 0
JOINT SWELLING: 0
NERVOUS/ANXIOUS: 0

## 2023-02-13 ASSESSMENT — PATIENT HEALTH QUESTIONNAIRE - PHQ9
SUM OF ALL RESPONSES TO PHQ QUESTIONS 1-9: 0
SUM OF ALL RESPONSES TO PHQ QUESTIONS 1-9: 0
10. IF YOU CHECKED OFF ANY PROBLEMS, HOW DIFFICULT HAVE THESE PROBLEMS MADE IT FOR YOU TO DO YOUR WORK, TAKE CARE OF THINGS AT HOME, OR GET ALONG WITH OTHER PEOPLE: NOT DIFFICULT AT ALL

## 2023-02-13 ASSESSMENT — PAIN SCALES - GENERAL: PAINLEVEL: NO PAIN (0)

## 2023-02-13 NOTE — PROGRESS NOTES
Answers for HPI/ROS submitted by the patient on 2/13/2023  If you checked off any problems, how difficult have these problems made it for you to do your work, take care of things at home, or get along with other people?: Not difficult at all  PHQ9 TOTAL SCORE: 0  MIGUEL ANGEL 7 TOTAL SCORE: 0       SUBJECTIVE:   CC: Fadia is an 22 year old who presents for preventive health visit.   Patient has been advised of split billing requirements and indicates understanding: Yes  Healthy Habits:     Getting at least 3 servings of Calcium per day:  Yes    Bi-annual eye exam:  NO    Dental care twice a year:  Yes    Sleep apnea or symptoms of sleep apnea:  None    Diet:  Other    Frequency of exercise:  2-3 days/week    Duration of exercise:  15-30 minutes    Taking medications regularly:  Yes    Medication side effects:  Not applicable    PHQ-2 Total Score: 0    Additional concerns today:  No    1. Bad breath-- brushing teeth, using mouth wash etc. Still having bad breath. Snores/mouth breather.   2. Not wiping well after urinating so sometimes has a vaginal odor and irritation. Trying baby wipes, monitoring etc. Using OTC barrier cream as needed     Today's PHQ-2 Score:   PHQ-2 ( 1999 Pf[employer]) 2/13/2023   Q1: Little interest or pleasure in doing things 0   Q2: Feeling down, depressed or hopeless 0   PHQ-2 Score 0   PHQ-2 Total Score (12-17 Years)- Positive if 3 or more points; Administer PHQ-A if positive -   Q1: Little interest or pleasure in doing things Not at all   Q2: Feeling down, depressed or hopeless Not at all   PHQ-2 Score 0       Have you ever done Advance Care Planning? (For example, a Health Directive, POLST, or a discussion with a medical provider or your loved ones about your wishes): No, advance care planning information given to patient to review.  Patient plans to discuss their wishes with loved ones or provider.      Social History     Tobacco Use     Smoking status: Passive Smoke Exposure - Never Smoker      Smokeless tobacco: Never     Tobacco comments:     Quit smoking: Patient's mother smokes in the house   Substance Use Topics     Alcohol use: Never     Alcohol/week: 0.0 standard drinks         Alcohol Use 2/13/2023   Prescreen: >3 drinks/day or >7 drinks/week? Not Applicable       Reviewed orders with patient.  Reviewed health maintenance and updated orders accordingly - Yes  Labs reviewed in EPIC    Breast Cancer Screening:        History of abnormal Pap smear: NO - age 21-29 PAP every 3 years recommended     Reviewed and updated as needed this visit by clinical staff   Tobacco  Allergies  Meds              Reviewed and updated as needed this visit by Provider                 Past Medical History:   Diagnosis Date     Attention-deficit hyperactivity disorder     05/02/06,See Dr. Bonilla's note     Epilepsy without status epilepticus, not intractable (H)     complex partial seizures with secondary generalization triggered by fevers; followed by Dr. Hu     Oppositional defiant disorder     emotionally reactive aggressive behavior     Other disorders of psychological development      10/2005,Testing done at [facility]     Other postprocedural complications and disorders of digestive system     8 and 9     Personal history of other diseases of the female genital tract     Vaginal delivery at 39 weeks gestation     Personal history of other medical treatment (CODE)     No Comments Provided     Pervasive developmental disorder     05/02/06,See Dr. Bonilla's note      Past Surgical History:   Procedure Laterality Date     OTHER SURGICAL HISTORY      13248.0,PAST SURGICAL HISTORY,No surgeries.       Review of Systems  CONSTITUTIONAL: NEGATIVE for fever, chills, change in weight  INTEGUMENTARU/SKIN: NEGATIVE for worrisome rashes, moles or lesions  EYES: NEGATIVE for vision changes or irritation  ENT: NEGATIVE for ear, mouth and throat problems  RESP: NEGATIVE for significant cough or SOB  BREAST: NEGATIVE for masses,  "tenderness or discharge  CV: NEGATIVE for chest pain, palpitations or peripheral edema  GI: NEGATIVE for nausea, abdominal pain, heartburn, or change in bowel habits  : NEGATIVE for unusual urinary or vaginal symptoms. Periods are regular.  MUSCULOSKELETAL: NEGATIVE for significant arthralgias or myalgia  NEURO: NEGATIVE for weakness, dizziness or paresthesias  PSYCHIATRIC: NEGATIVE for changes in mood or affect     OBJECTIVE:   /74 (BP Location: Right arm, Patient Position: Sitting, Cuff Size: Adult Regular)   Pulse 111   Temp 97.6  F (36.4  C) (Tympanic)   Resp 20   Ht 1.612 m (5' 3.45\")   Wt 78.2 kg (172 lb 8 oz)   LMP  (Approximate)   SpO2 98%   BMI 30.13 kg/m    Physical Exam  GENERAL: healthy, alert and no distress  EYES: Eyes grossly normal to inspection, PERRL and conjunctivae and sclerae normal  HENT: ear canals and TM's normal, nose and mouth without ulcers or lesions  NECK: no adenopathy, no asymmetry, masses, or scars and thyroid normal to palpation  RESP: lungs clear to auscultation - no rales, rhonchi or wheezes  CV: regular rate and rhythm, normal S1 S2, no S3 or S4, no murmur, click or rub, no peripheral edema and peripheral pulses strong  ABDOMEN: soft, nontender, no hepatosplenomegaly, no masses and bowel sounds normal   (female): vulvar redness, normal urethral meatus, vaginal mucosa, normal cervix/adnexa/uterus without masses or discharge  MS: no gross musculoskeletal defects noted, no edema  SKIN: no suspicious lesions or rashes  NEURO: answers questions appropriately   PSYCH: affect normal/bright    Diagnostic Test Results:  Labs reviewed in Epic    ASSESSMENT/PLAN:   Fadia was seen today for physical.    Diagnoses and all orders for this visit:    Routine general medical examination at a health care facility  At some point is due for HIV, hep C, and chlamydia screen. Declines today.   -     Pap Screen Only - recommended age 21 - 24 years    Encounter for management and " "injection of depo-Provera  -     Pregnancy, Urine (HCG)      Dry mouth  Trial biotene in case dry mouth/mouth breathing/snoring is causing the bad breath  -     artificial saliva (BIOTENE MT) AERS spray; Take 1 spray by mouth every 6 hours as needed for dry mouth    Other orders  -     COVID-19 VACCINE BIVALENT BOOSTER 12+ (PFIZER)        Patient has been advised of split billing requirements and indicates understanding: Yes      COUNSELING:  Reviewed preventive health counseling, as reflected in patient instructions       Regular exercise       Healthy diet/nutrition       Family planning       Osteoporosis prevention/bone health       Consider Hep C screening for all patients one time for ages 18-79 years       HIV screeninx in teen years, 1x in adult years, and at intervals if high risk      BMI:   Estimated body mass index is 30.13 kg/m  as calculated from the following:    Height as of this encounter: 1.612 m (5' 3.45\").    Weight as of this encounter: 78.2 kg (172 lb 8 oz).   Weight management plan: Discussed healthy diet and exercise guidelines      She reports that she is a non-smoker but has been exposed to tobacco smoke. She has never used smokeless tobacco.      Luis Armando Mata MD  [facility]  "

## 2023-02-13 NOTE — RESULT ENCOUNTER NOTE
Results discussed directly with patient while patient was present. Any further details documented in the note.   Luis Armando Mata MD

## 2023-02-13 NOTE — NURSING NOTE
Patient presents to clinic with Care Giver Analia for her physical exam.  Also, Depo injection and Covid vaccine.    Medication Rec Complete  Kasia Crane LPN............2/13/2023 8:55 AM

## 2023-02-16 LAB
BKR LAB AP GYN ADEQUACY: NORMAL
BKR LAB AP GYN INTERPRETATION: NORMAL
BKR LAB AP HPV REFLEX: NO
BKR LAB AP PREVIOUS ABNORMAL: NORMAL
PATH REPORT.COMMENTS IMP SPEC: NORMAL
PATH REPORT.COMMENTS IMP SPEC: NORMAL
PATH REPORT.RELEVANT HX SPEC: NORMAL

## 2023-04-19 ENCOUNTER — TELEPHONE (OUTPATIENT)
Dept: FAMILY MEDICINE | Facility: OTHER | Age: 23
End: 2023-04-19
Payer: MEDICAID

## 2023-04-19 NOTE — TELEPHONE ENCOUNTER
Called and informed Cyn Gardenr of Dr. Lou Mata's response and Cyn verbalized understanding.      Sandy Rea RN on 4/19/2023 at 2:55 PM

## 2023-04-19 NOTE — TELEPHONE ENCOUNTER
Patients caregiver, Cyn, calling and states that patient is at work and they gave patient her regular noon medications Clonidine 0.05 mg , seroquel 200mg and then someone else gave her someone else's noon medications which were Lamotrigine 100 mg tablet and buspirone 10 mg tablet    Cyn states patient takes Lamotrigine 300 mg  AM and 300 mg in PM     Cyn states patient is on her way home on the bus which she is leaving here shortly to go  in [place] .    Cyn states patients work just reported it to her as they just realized.  Cyn not sure how patient is doing yet but .  Cyn wondering what to watch for and should she cut back on patients afternoon dose of Lamotrigine.    Sandy Rea RN on 4/19/2023 at 2:44 PM

## 2023-04-19 NOTE — TELEPHONE ENCOUNTER
Ok to give only 100 mg of the afternoon lamictal.   Buspirone: watch for signs of GI upset, headache, dizziness.

## 2023-05-12 ENCOUNTER — ALLIED HEALTH/NURSE VISIT (OUTPATIENT)
Dept: FAMILY MEDICINE | Facility: OTHER | Age: 23
End: 2023-05-12
Attending: STUDENT IN AN ORGANIZED HEALTH CARE EDUCATION/TRAINING PROGRAM
Payer: MEDICAID

## 2023-05-12 DIAGNOSIS — Z30.42 ENCOUNTER FOR DEPO-PROVERA CONTRACEPTION: Primary | ICD-10-CM

## 2023-05-12 PROCEDURE — 250N000011 HC RX IP 250 OP 636: Performed by: STUDENT IN AN ORGANIZED HEALTH CARE EDUCATION/TRAINING PROGRAM

## 2023-05-12 PROCEDURE — 96372 THER/PROPH/DIAG INJ SC/IM: CPT | Performed by: STUDENT IN AN ORGANIZED HEALTH CARE EDUCATION/TRAINING PROGRAM

## 2023-05-12 RX ADMIN — MEDROXYPROGESTERONE ACETATE 150 MG: 150 INJECTION, SUSPENSION INTRAMUSCULAR at 14:04

## 2023-05-12 NOTE — NURSING NOTE
Patient presents to clinic for annual depo injection for birth control management.    Kasia Crane LPN............5/12/2023 11:21 AM

## 2023-05-19 RX ORDER — MEDROXYPROGESTERONE ACETATE 150 MG/ML
150 INJECTION, SUSPENSION INTRAMUSCULAR
COMMUNITY
Start: 2023-05-19

## 2023-05-19 NOTE — PROGRESS NOTES
Patient requesting depo injection at this time.  Clinic Administered Medication Documentation      Depo Provera Documentation    Depo-Provera Standing Order inclusion/exclusion criteria reviewed.     Is this the initial or subsequent dose of Depo Provera? Subsequent dose - patient is within the acceptable window of time (11-15 weeks) for subsequent injection. Pregnancy test not indicated.    Patient meets: inclusion criteria     Is there an active order (written within the past 365 days, with administrations remaining, not ) in the chart? Yes.     Prior to injection, verified patient identity using patient's name and date of birth. Medication was administered. Please see MAR and medication order for additional information.     Vial/Syringe: Single dose vial. Was entire vial of medication used? Yes    Patient instructed to remain in clinic for 15 minutes, report any adverse reaction to staff immediately and remain in clinic for 15 minutes and report any adverse reaction to staff immediately but patient declined.  NEXT INJECTION DUE: 23 - 23    Verified that the patient has refills remaining in their prescription.

## 2023-06-09 DIAGNOSIS — F91.3 OPPOSITIONAL DEFIANT DISORDER: ICD-10-CM

## 2023-06-09 DIAGNOSIS — F91.1 UNDERSOCIALIZED CONDUCT DISORDER, AGGRESSIVE TYPE: ICD-10-CM

## 2023-06-09 NOTE — TELEPHONE ENCOUNTER
"  Last Prescription Date: 12/14/22  Last Qty/Refills: 90 / 5  Last Office Visit: 2/13/23 KWA  Future Office Visit: none     Requested Prescriptions   Pending Prescriptions Disp Refills     QUEtiapine (SEROQUEL) 200 MG tablet 90 tablet 5     Sig: Take 1 tablet (200 mg) by mouth three times daily       Antipsychotic Medications Failed - 6/9/2023  2:17 PM        Failed - Lipid panel on file within the past 12 months     Recent Labs   Lab Test 02/21/20  0953   CHOL 137   TRIG 89   HDL 36   LDL 83   NHDL 101               Failed - A1c or Glucose on file in past 12 months     Recent Labs   Lab Test 02/21/22  0914 02/21/20  0953   GLC 86 131*   A1C  --  4.4       Please review patients last 3 weights. If a weight gain of >10 lbs exists, you may refill the prescription once after instructing the patient to schedule an appointment within the next 30 days.    Wt Readings from Last 3 Encounters:   02/13/23 78.2 kg (172 lb 8 oz)   06/03/22 77.6 kg (171 lb)   02/21/22 78.3 kg (172 lb 9.6 oz)             Passed - Blood pressure under 140/90 in past 12 months     BP Readings from Last 3 Encounters:   02/13/23 128/74   06/03/22 124/80   03/11/22 128/88                 Passed - Heart Rate on file within past 12 months     Pulse Readings from Last 3 Encounters:   02/13/23 111   06/03/22 94   02/21/22 112               Passed - Medication is active on med list        Passed - Patient is 18 years of age or older        Passed - Patient is not pregnant        Passed - No positve pregnancy test on file in past 12 months        Passed - Recent (6 mo) or future (30 days) visit within the authorizing provider's specialty     Patient had office visit in the last 6 months or has a visit in the next 30 days with authorizing provider or within the authorizing provider's specialty.  See \"Patient Info\" tab in inbasket, or \"Choose Columns\" in Meds & Orders section of the refill encounter.                 "

## 2023-06-12 RX ORDER — QUETIAPINE FUMARATE 200 MG/1
TABLET, FILM COATED ORAL
Qty: 90 TABLET | Refills: 2 | Status: SHIPPED | OUTPATIENT
Start: 2023-06-12 | End: 2023-09-15

## 2023-08-10 ENCOUNTER — ALLIED HEALTH/NURSE VISIT (OUTPATIENT)
Dept: FAMILY MEDICINE | Facility: OTHER | Age: 23
End: 2023-08-10
Attending: STUDENT IN AN ORGANIZED HEALTH CARE EDUCATION/TRAINING PROGRAM
Payer: MEDICAID

## 2023-08-10 VITALS — SYSTOLIC BLOOD PRESSURE: 132 MMHG | WEIGHT: 169.2 LBS | BODY MASS INDEX: 29.55 KG/M2 | DIASTOLIC BLOOD PRESSURE: 74 MMHG

## 2023-08-10 DIAGNOSIS — Z30.42 ENCOUNTER FOR DEPO-PROVERA CONTRACEPTION: Primary | ICD-10-CM

## 2023-08-10 PROCEDURE — 250N000011 HC RX IP 250 OP 636: Mod: JZ | Performed by: STUDENT IN AN ORGANIZED HEALTH CARE EDUCATION/TRAINING PROGRAM

## 2023-08-10 PROCEDURE — 96372 THER/PROPH/DIAG INJ SC/IM: CPT | Performed by: STUDENT IN AN ORGANIZED HEALTH CARE EDUCATION/TRAINING PROGRAM

## 2023-08-10 RX ADMIN — MEDROXYPROGESTERONE ACETATE 150 MG: 150 INJECTION, SUSPENSION INTRAMUSCULAR at 08:22

## 2023-08-10 NOTE — PROGRESS NOTES
Clinic Administered Medication Documentation      Depo Provera Documentation    Depo-Provera Standing Order inclusion/exclusion criteria reviewed. {  Is this the initial or subsequent dose of Depo Provera? Subsequent dose - patient is within the acceptable window of time (11-15 weeks) for subsequent injection. Pregnancy test not indicated.    Patient meets: inclusion criteria     Is there an active order (written within the past 365 days, with administrations remaining, not ) in the chart? Yes.     Prior to injection, verified patient identity using patient's name and date of birth. Medication was administered. Please see MAR and medication order for additional information.     Vial/Syringe: Single dose vial. Was entire vial of medication used? Yes    NEXT INJECTION DUE: 10/26/23 - 23    Verified that the patient has refills remaining in their prescription.    Anjelica Church RN on 8/10/2023 at 8:33 AM

## 2023-09-15 DIAGNOSIS — F91.3 OPPOSITIONAL DEFIANT DISORDER: ICD-10-CM

## 2023-09-15 DIAGNOSIS — F91.1 UNDERSOCIALIZED CONDUCT DISORDER, AGGRESSIVE TYPE: ICD-10-CM

## 2023-09-18 DIAGNOSIS — Z00.00 HEALTH CARE MAINTENANCE: ICD-10-CM

## 2023-09-20 RX ORDER — MULTIVITAMIN WITH IRON
TABLET ORAL
Qty: 100 TABLET | Refills: 4 | Status: SHIPPED | OUTPATIENT
Start: 2023-09-20 | End: 2024-05-30

## 2023-09-20 NOTE — TELEPHONE ENCOUNTER
Reason for call: Medication or medication refill    Name of medication requested: Quietepine    How many days of medication do you have left? 2 days today and tomorrow and need sooner than as they are going out of town.  Need by Thursday evening.      What pharmacy do you use? [facility]     Preferred method for responding to this message: Telephone Call    Phone number patient can be reached at: Cell number on file:    Telephone Information:   Mobile [phone]       If we cannot reach you directly, may we leave a detailed response at the number you provided? Yes

## 2023-09-20 NOTE — TELEPHONE ENCOUNTER
"Requested Prescriptions   Pending Prescriptions Disp Refills    QUEtiapine (SEROQUEL) 200 MG tablet 90 tablet 2     Sig: Take 1 tablet (200 mg) by mouth three times daily       Antipsychotic Medications Failed - 9/20/2023 11:53 AM        Failed - Lipid panel on file within the past 12 months     Recent Labs   Lab Test 02/21/20  0953   CHOL 137   TRIG 89   HDL 36   LDL 83   NHDL 101               Failed - A1c or Glucose on file in past 12 months     Recent Labs   Lab Test 02/21/22  0914 02/21/20  0953   GLC 86 131*   A1C  --  4.4       Please review patients last 3 weights. If a weight gain of >10 lbs exists, you may refill the prescription once after instructing the patient to schedule an appointment within the next 30 days.    Wt Readings from Last 3 Encounters:   08/10/23 76.7 kg (169 lb 3.2 oz)   02/13/23 78.2 kg (172 lb 8 oz)   06/03/22 77.6 kg (171 lb)             Passed - Blood pressure under 140/90 in past 12 months     BP Readings from Last 3 Encounters:   08/10/23 132/74   02/13/23 128/74   06/03/22 124/80                 Passed - Heart Rate on file within past 12 months     Pulse Readings from Last 3 Encounters:   02/13/23 111   06/03/22 94   02/21/22 112               Passed - Medication is active on med list        Passed - Patient is 18 years of age or older        Passed - Patient is not pregnant        Passed - No positve pregnancy test on file in past 12 months        Passed - Recent (6 mo) or future (30 days) visit within the authorizing provider's specialty     Patient had office visit in the last 6 months or has a visit in the next 30 days with authorizing provider or within the authorizing provider's specialty.  See \"Patient Info\" tab in inbasket, or \"Choose Columns\" in Meds & Orders section of the refill encounter.                LOV: 2/13/2023  Future Office visit:    Next 5 appointments (look out 90 days)      Oct 12, 2023  9:20 AM  SHORT with Luis Armando Mata MD  [facility] and " [facility] ) [address]  [place] [address]  [phone]          Routing refill request to provider for review/approval. Madalyn Armstrong RN  ....................  9/20/2023   5:43 PM

## 2023-09-20 NOTE — TELEPHONE ENCOUNTER
vitamin B6 (PYRIDOXINE) 100 MG tablet     Sig: TAKE 1 AND 1/2 TABLETS BY MOUTH EVERY MORNING AND 1/2 TABLET EVERY EVENING         Last Written Prescription Date:  1/18/23  Last Fill Quantity: 100,   # refills: 4  Last Office Visit: 2/13/23  Future Office visit:    Next 5 appointments (look out 90 days)      Oct 12, 2023  9:20 AM  SHORT with Luis Armando Mata MD  [facility] ([facility] ) [address]  [place] [address]  [phone]             Routing refill request to provider for review/approval because:  Drug not on the [facility], [facility] or [facility] refill protocol or controlled substance Mecca Metz RN on 9/20/2023 at 10:52 AM

## 2023-09-21 RX ORDER — QUETIAPINE FUMARATE 200 MG/1
TABLET, FILM COATED ORAL
Qty: 90 TABLET | Refills: 2 | Status: SHIPPED | OUTPATIENT
Start: 2023-09-21 | End: 2023-12-14

## 2023-10-12 ENCOUNTER — OFFICE VISIT (OUTPATIENT)
Dept: FAMILY MEDICINE | Facility: OTHER | Age: 23
End: 2023-10-12
Attending: STUDENT IN AN ORGANIZED HEALTH CARE EDUCATION/TRAINING PROGRAM
Payer: MEDICAID

## 2023-10-12 VITALS
HEIGHT: 64 IN | TEMPERATURE: 97.8 F | DIASTOLIC BLOOD PRESSURE: 64 MMHG | WEIGHT: 166.25 LBS | SYSTOLIC BLOOD PRESSURE: 110 MMHG | BODY MASS INDEX: 28.38 KG/M2 | RESPIRATION RATE: 18 BRPM | HEART RATE: 96 BPM | OXYGEN SATURATION: 98 %

## 2023-10-12 DIAGNOSIS — F91.1 UNDERSOCIALIZED CONDUCT DISORDER, AGGRESSIVE TYPE: ICD-10-CM

## 2023-10-12 DIAGNOSIS — F91.3 OPPOSITIONAL DEFIANT DISORDER: ICD-10-CM

## 2023-10-12 DIAGNOSIS — H61.23 BILATERAL IMPACTED CERUMEN: Primary | ICD-10-CM

## 2023-10-12 PROCEDURE — 99214 OFFICE O/P EST MOD 30 MIN: CPT | Performed by: STUDENT IN AN ORGANIZED HEALTH CARE EDUCATION/TRAINING PROGRAM

## 2023-10-12 PROCEDURE — G0463 HOSPITAL OUTPT CLINIC VISIT: HCPCS

## 2023-10-12 RX ORDER — CLONIDINE HYDROCHLORIDE 0.1 MG/1
TABLET ORAL
Qty: 135 TABLET | Refills: 5 | Status: SHIPPED | OUTPATIENT
Start: 2023-10-12 | End: 2024-05-31

## 2023-10-12 ASSESSMENT — PAIN SCALES - GENERAL: PAINLEVEL: NO PAIN (0)

## 2023-10-12 NOTE — PROGRESS NOTES
"  Assessment & Plan     Oppositional defiant disorder  Undersocialized conduct disorder, aggressive type  Trial half tablet in the am and full tablet at noon due to behaviors at noon.   - cloNIDine (CATAPRES) 0.1 MG tablet; TAKE 1/2 TABLET(0.05 MG) in the morning and full tablet at noon (0.1 mg)    Bilateral impacted cerumen  Decided against ear flush today. Trial debrox drops  - carbamide peroxide (DEBROX) 6.5 % otic solution; Place 5 drops into both ears 2 times daily             BMI:   Estimated body mass index is 28.99 kg/m  as calculated from the following:    Height as of this encounter: 1.613 m (5' 3.5\").    Weight as of this encounter: 75.4 kg (166 lb 4 oz).           No follow-ups on file.    Luis Armando Mata MD  [facility]    Subjective   Fadia is a 23 year old, presenting for the following health issues:  Ear Problem (Decreased hearing) and Medication Follow-up (Medication management)      Ear Problem    History of Present Illness       Reason for visit:  Check up med check    She eats 2-3 servings of fruits and vegetables daily.She consumes 0 sweetened beverage(s) daily.She exercises with enough effort to increase her heart rate 10 to 19 minutes per day.  She exercises with enough effort to increase her heart rate 5 days per week.   She is taking medications regularly.     Ear check and eye check   - hearing seems worse lately  - eyes seems worse     Behaviors  - old ones are back again, banging head on wall, biting self, etc  - mostly in the afternoon  - no big changes to meds or life  - evenings are fine  - wondering about switching clonidine. Was taking 0.5 tablet TID. Would like to try 0.5 tablet in the AM and full tablet at noon                Review of Systems   HENT:  Positive for ear pain.       Constitutional, HEENT, cardiovascular, pulmonary, gi and gu systems are negative, except as otherwise noted.      Objective    /64 (BP Location: Right arm, Patient " "Position: Sitting, Cuff Size: Adult Regular)   Pulse 96   Temp 97.8  F (36.6  C) (Tympanic)   Resp 18   Ht 1.613 m (5' 3.5\")   Wt 75.4 kg (166 lb 4 oz)   LMP  (Approximate)   SpO2 98%   BMI 28.99 kg/m    Body mass index is 28.99 kg/m .  Physical Exam   GENERAL: healthy, alert and no distress  EYES: Eyes grossly normal to inspection, PERRL and conjunctivae and sclerae normal  HENT: normal cephalic/atraumatic, both ears: occluded with wax, nose and mouth without ulcers or lesions, oropharynx clear, and oral mucous membranes moist  RESP: lungs clear to auscultation - no rales, rhonchi or wheezes  CV: regular rate and rhythm, normal S1 S2, no S3 or S4, no murmur, click or rub, no peripheral edema and peripheral pulses strong                      "

## 2023-10-12 NOTE — NURSING NOTE
Patient presents to clinic with [facility] staff experiencing decreased hearing.  Also, would like to discus medication management.  Medication Reconciliation: Complete    Kasia Crane LPN  10/12/2023 9:17 AM

## 2023-11-02 DIAGNOSIS — Z30.013 ENCOUNTER FOR INITIAL PRESCRIPTION OF INJECTABLE CONTRACEPTIVE: ICD-10-CM

## 2023-11-02 RX ORDER — MEDROXYPROGESTERONE ACETATE 150 MG/ML
150 INJECTION, SUSPENSION INTRAMUSCULAR
Status: ACTIVE | OUTPATIENT
Start: 2023-11-03

## 2023-11-02 NOTE — PROGRESS NOTES
Patient was seen on 10/12/23. Last px was 2/13/23. Order sent to provider for review and sign if appropriate.  Krystyna Lay RN on 11/2/2023 at 9:19 AM

## 2023-11-03 ENCOUNTER — ALLIED HEALTH/NURSE VISIT (OUTPATIENT)
Dept: FAMILY MEDICINE | Facility: OTHER | Age: 23
End: 2023-11-03
Attending: STUDENT IN AN ORGANIZED HEALTH CARE EDUCATION/TRAINING PROGRAM
Payer: MEDICAID

## 2023-11-03 DIAGNOSIS — Z23 ENCOUNTER FOR IMMUNIZATION: Primary | ICD-10-CM

## 2023-11-03 PROCEDURE — 250N000011 HC RX IP 250 OP 636: Mod: JZ | Performed by: STUDENT IN AN ORGANIZED HEALTH CARE EDUCATION/TRAINING PROGRAM

## 2023-11-03 PROCEDURE — 91320 SARSCV2 VAC 30MCG TRS-SUC IM: CPT

## 2023-11-03 PROCEDURE — 90686 IIV4 VACC NO PRSV 0.5 ML IM: CPT

## 2023-11-03 PROCEDURE — G0008 ADMIN INFLUENZA VIRUS VAC: HCPCS

## 2023-11-03 PROCEDURE — 96372 THER/PROPH/DIAG INJ SC/IM: CPT | Performed by: STUDENT IN AN ORGANIZED HEALTH CARE EDUCATION/TRAINING PROGRAM

## 2023-11-03 RX ADMIN — MEDROXYPROGESTERONE ACETATE 150 MG: 150 INJECTION, SUSPENSION INTRAMUSCULAR at 09:01

## 2023-11-03 NOTE — PROGRESS NOTES
Immunization Documentation  Verified patient's first and last name, and . Stated reason for visit today is to receive covid, flu vaccines. Accompained to clinic visit with mother. Denied any concerns with previous immunizations. Allergies reviewed. VIS handout(s) reviewed and given to take home. vaccines prepared and administered per standing order. Administration documented in IMMUNIZATIONS (see flowsheet and order for further information).     Krystyna Lay RN ....................  11/3/2023   8:46 AM    Clinic Administered Medication Documentation      Depo Provera Documentation    Depo-Provera Standing Order inclusion/exclusion criteria reviewed.     Is this the initial or subsequent dose of Depo Provera? Subsequent dose - patient is within the acceptable window of time (11-15 weeks) for subsequent injection. Pregnancy test not indicated.    Patient meets: inclusion criteria     Is there an active order (written within the past 365 days, with administrations remaining, not ) in the chart? Yes.     Prior to injection, verified patient identity using patient's name and date of birth. Medication was administered. Please see MAR and medication order for additional information.     Vial/Syringe: Single dose vial. Was entire vial of medication used? Yes    Patient instructed to report any adverse reaction to staff immediately.  NEXT INJECTION DUE: 24 - 24    Verified that the patient has refills remaining in their prescription.    Krystyna Lay RN on 11/3/2023 at 9:05 AM

## 2023-12-14 DIAGNOSIS — F91.3 OPPOSITIONAL DEFIANT DISORDER: ICD-10-CM

## 2023-12-14 DIAGNOSIS — F91.1 UNDERSOCIALIZED CONDUCT DISORDER, AGGRESSIVE TYPE: ICD-10-CM

## 2023-12-14 RX ORDER — QUETIAPINE FUMARATE 200 MG/1
TABLET, FILM COATED ORAL
Qty: 90 TABLET | Refills: 3 | Status: SHIPPED | OUTPATIENT
Start: 2023-12-14 | End: 2024-04-08

## 2023-12-14 NOTE — TELEPHONE ENCOUNTER
[facility] sent Rx request for the following:      Requested Prescriptions   Pending Prescriptions Disp Refills    QUEtiapine (SEROQUEL) 200 MG tablet [Pharmacy Med Name: QUETIAPINE 200MG TABLETS] 90 tablet 2     Sig: TAKE 1 TABLET(200 MG) BY MOUTH THREE TIMES DAILY       Antipsychotic Medications Failed - 12/14/2023 10:12 AM        Failed - Lipid panel on file within the past 12 months     Recent Labs   Lab Test 02/21/20  0953   CHOL 137   TRIG 89   HDL 36   LDL 83   NHDL 101           Failed - A1c or Glucose on file in past 12 months     Recent Labs   Lab Test 02/21/22  0914 02/21/20  0953   GLC 86 131*   A1C  --  4.4   Please review patients last 3 weights. If a weight gain of >10 lbs exists, you may refill the prescription once after instructing the patient to schedule an appointment within the next 30 days.  Wt Readings from Last 3 Encounters:   10/12/23 75.4 kg (166 lb 4 oz)   08/10/23 76.7 kg (169 lb 3.2 oz)   02/13/23 78.2 kg (172 lb 8 oz)        Last Prescription Date:   9/21/23  Last Fill Qty/Refills:         90, R-2    Last Office Visit:              10/12/23   Future Office visit:           None    Unable to complete prescription refill per RN Medication Refill Policy.     Kasia Beckford RN .............. 12/14/2023  10:13 AM

## 2024-01-26 ENCOUNTER — ALLIED HEALTH/NURSE VISIT (OUTPATIENT)
Dept: FAMILY MEDICINE | Facility: OTHER | Age: 24
End: 2024-01-26
Attending: STUDENT IN AN ORGANIZED HEALTH CARE EDUCATION/TRAINING PROGRAM
Payer: MEDICAID

## 2024-01-26 DIAGNOSIS — Z30.42 ENCOUNTER FOR DEPO-PROVERA CONTRACEPTION: Primary | ICD-10-CM

## 2024-01-26 PROCEDURE — 250N000011 HC RX IP 250 OP 636: Mod: JZ | Performed by: STUDENT IN AN ORGANIZED HEALTH CARE EDUCATION/TRAINING PROGRAM

## 2024-01-26 PROCEDURE — 96372 THER/PROPH/DIAG INJ SC/IM: CPT | Performed by: STUDENT IN AN ORGANIZED HEALTH CARE EDUCATION/TRAINING PROGRAM

## 2024-01-26 RX ADMIN — MEDROXYPROGESTERONE ACETATE 150 MG: 150 INJECTION, SUSPENSION INTRAMUSCULAR at 08:04

## 2024-01-26 NOTE — PROGRESS NOTES
Clinic Administered Medication Documentation    Depo Provera Documentation    Depo-Provera Standing Order inclusion/exclusion criteria reviewed.     Is this the initial or subsequent dose of Depo Provera? Subsequent dose - patient is within the acceptable window of time (11-15 weeks) for subsequent injection. Pregnancy test not indicated.    Patient meets: inclusion criteria     Is there an active order (written within the past 365 days, with administrations remaining, not ) in the chart? Yes.     Prior to injection, verified patient identity using patient's name and date of birth. Medication was administered. Please see MAR and medication order for additional information.     Vial/Syringe: Single dose vial. Was entire vial of medication used? Yes    Patient instructed to remain in clinic for 15 minutes and report any adverse reaction to staff immediately but patient declined.  NEXT INJECTION DUE: 24 - 5/10/24    RAJEEV PALMA RN on 2024 at 8:05 AM

## 2024-04-04 DIAGNOSIS — F91.1 UNDERSOCIALIZED CONDUCT DISORDER, AGGRESSIVE TYPE: ICD-10-CM

## 2024-04-04 DIAGNOSIS — F91.3 OPPOSITIONAL DEFIANT DISORDER: ICD-10-CM

## 2024-04-08 RX ORDER — QUETIAPINE FUMARATE 200 MG/1
TABLET, FILM COATED ORAL
Qty: 90 TABLET | Refills: 0 | Status: SHIPPED | OUTPATIENT
Start: 2024-04-08 | End: 2024-05-09

## 2024-04-08 NOTE — TELEPHONE ENCOUNTER
[facility] sent Rx request for the following:      Requested Prescriptions   Pending Prescriptions Disp Refills    QUEtiapine (SEROQUEL) 200 MG tablet [Pharmacy Med Name: QUETIAPINE 200MG TABLETS] 90 tablet 3     Sig: TAKE 1 TABLET(200 MG) BY MOUTH THREE TIMES DAILY       Antipsychotic Medications Failed - 4/8/2024  3:23 PM        Failed - Lipid panel on file within the past 12 months     Recent Labs   Lab Test 02/21/20  0953   CHOL 137   TRIG 89   HDL 36   LDL 83   NHDL 101               Failed - A1c or Glucose on file in past 12 months     Recent Labs   Lab Test 02/21/22  0914 02/21/20  0953   GLC 86 131*   A1C  --  4.4       Please review patients last 3 weights. If a weight gain of >10 lbs exists, you may refill the prescription once after instructing the patient to schedule an appointment within the next 30 days.    Wt Readings from Last 3 Encounters:   10/12/23 75.4 kg (166 lb 4 oz)   08/10/23 76.7 kg (169 lb 3.2 oz)   02/13/23 78.2 kg (172 lb 8 oz)            Last Prescription Date:   12/14/23  Last Fill Qty/Refills:         90, R-3    Last Office Visit:              10/12/23   Future Office visit:           none    Routing refill request to provider for review/approval because:  Labs not current:  A1C, Lipid    Will route to unit schedulers, patient is due for yearly wellness exam.    Krystyna Lay RN on 4/8/2024 at 3:26 PM

## 2024-04-26 ENCOUNTER — ALLIED HEALTH/NURSE VISIT (OUTPATIENT)
Dept: FAMILY MEDICINE | Facility: OTHER | Age: 24
End: 2024-04-26
Attending: STUDENT IN AN ORGANIZED HEALTH CARE EDUCATION/TRAINING PROGRAM
Payer: MEDICAID

## 2024-04-26 DIAGNOSIS — Z30.42 ENCOUNTER FOR DEPO-PROVERA CONTRACEPTION: Primary | ICD-10-CM

## 2024-04-26 PROCEDURE — 96372 THER/PROPH/DIAG INJ SC/IM: CPT | Performed by: STUDENT IN AN ORGANIZED HEALTH CARE EDUCATION/TRAINING PROGRAM

## 2024-04-26 PROCEDURE — 250N000011 HC RX IP 250 OP 636: Mod: JZ | Performed by: STUDENT IN AN ORGANIZED HEALTH CARE EDUCATION/TRAINING PROGRAM

## 2024-04-26 RX ADMIN — MEDROXYPROGESTERONE ACETATE 150 MG: 150 INJECTION, SUSPENSION INTRAMUSCULAR at 08:40

## 2024-04-26 NOTE — PROGRESS NOTES
Clinic Administered Medication Documentation    Depo Provera Documentation    Depo-Provera Standing Order inclusion/exclusion criteria reviewed.     Is this the initial or subsequent dose of Depo Provera? Subsequent dose - patient is within the acceptable window of time (11-15 weeks) for subsequent injection. Pregnancy test not indicated.    Patient meets: inclusion criteria     Is there an active order (written within the past 365 days, with administrations remaining, not ) in the chart? Yes.     Prior to injection, verified patient identity using patient's name and date of birth. Medication was administered. Please see MAR and medication order for additional information.     Vial/Syringe: Single dose vial. Was entire vial of medication used? Yes    Patient instructed to remain in clinic for 15 minutes and report any adverse reaction to staff immediately but patient declined.  NEXT INJECTION DUE: 24 - 24    RAJEEV PALMA RN on 2024 at 8:49 AM

## 2024-05-04 ENCOUNTER — HEALTH MAINTENANCE LETTER (OUTPATIENT)
Age: 24
End: 2024-05-04

## 2024-05-07 DIAGNOSIS — F91.3 OPPOSITIONAL DEFIANT DISORDER: ICD-10-CM

## 2024-05-07 DIAGNOSIS — F91.1 UNDERSOCIALIZED CONDUCT DISORDER, AGGRESSIVE TYPE: ICD-10-CM

## 2024-05-09 ENCOUNTER — TELEPHONE (OUTPATIENT)
Dept: FAMILY MEDICINE | Facility: OTHER | Age: 24
End: 2024-05-09
Payer: MEDICAID

## 2024-05-09 RX ORDER — QUETIAPINE FUMARATE 200 MG/1
TABLET, FILM COATED ORAL
Qty: 90 TABLET | Refills: 0 | Status: SHIPPED | OUTPATIENT
Start: 2024-05-09 | End: 2024-05-31

## 2024-05-09 RX ORDER — QUETIAPINE FUMARATE 200 MG/1
TABLET, FILM COATED ORAL
Qty: 90 TABLET | Refills: 0 | OUTPATIENT
Start: 2024-05-09

## 2024-05-09 NOTE — TELEPHONE ENCOUNTER
Called and spoke with Cyn, after she verified Pt's last name and . She was informed of prescription. Kasia Beckford RN .............. 2024  3:44 PM

## 2024-05-09 NOTE — TELEPHONE ENCOUNTER
"[facility] sent Rx request for the following:        Due for yearly preventive visit. Please call to schedule appt. Patient can see Family Practice provider.       Requested Prescriptions   Pending Prescriptions Disp Refills    QUEtiapine (SEROQUEL) 200 MG tablet [Pharmacy Med Name: QUETIAPINE 200MG TABLETS] 90 tablet 0     Sig: TAKE 1 TABLET(200 MG) BY MOUTH THREE TIMES DAILY       Antipsychotic Medications Failed - 5/7/2024  5:09 PM        Failed - Lipid panel on file within the past 12 months     Recent Labs   Lab Test 02/21/20  0953   CHOL 137   TRIG 89   HDL 36   LDL 83   NHDL 101               Failed - A1c or Glucose on file in past 12 months     Recent Labs   Lab Test 02/21/22  0914 02/21/20  0953   GLC 86 131*   A1C  --  4.4       Please review patients last 3 weights. If a weight gain of >10 lbs exists, you may refill the prescription once after instructing the patient to schedule an appointment within the next 30 days.    Wt Readings from Last 3 Encounters:   10/12/23 75.4 kg (166 lb 4 oz)   08/10/23 76.7 kg (169 lb 3.2 oz)   02/13/23 78.2 kg (172 lb 8 oz)             Failed - Recent (6 mo) or future (30 days) visit within the authorizing provider's specialty     Patient had office visit in the last 6 months or has a visit in the next 30 days with authorizing provider or within the authorizing provider's specialty.  See \"Patient Info\" tab in inbasket, or \"Choose Columns\" in Meds & Orders section of the refill encounter.              Last Prescription Date:   4/8/2024  Last Fill Qty/Refills:         90 , R-0    Last Office Visit:              10/12/23   Future Office visit:          None       Yesy Sanderson RN.......5/9/20241:54 PM    "

## 2024-05-09 NOTE — TELEPHONE ENCOUNTER
Reason for call: Medication or medication refill    Have you contacted your pharmacy regarding this refill? YES    If No, direct the patient to contact the Pharmacy and discontinue telephone note using Erroneous Encounter.  If Yes, continue.    Name of medication requested: Seroquel    How many days of medication do you have left? FOUR    What pharmacy do you use? [facility]    Preferred method for responding to this message: Telephone Call    Phone number patient can be reached at: Other phone number:  Cyn: [phone]    If we cannot reach you directly, may we leave a detailed response at the number you provided? Yes      Patients mom, Carey, stated the pharmacy asked her to call [facility] and if calling back to please return the call to Cyn (emergency contact) at [phone].            Madison Angelo on 5/9/2024 at 3:35 PM

## 2024-05-10 NOTE — TELEPHONE ENCOUNTER
Reason for call: Medication or medication refill    Have you contacted your pharmacy regarding this refill? no    If No, direct the patient to contact the Pharmacy and discontinue telephone note using Erroneous Encounter.  If Yes, continue.    Name of medication requested:     How many days of medication do you have left? SEROQUEL     What pharmacy do you use? [facility]     Preferred method for responding to this message: Telephone Call    Phone number patient can be reached at: Home number on file [phone] (home)    If we cannot reach you directly, may we leave a detailed response at the number you provided? Yes

## 2024-05-23 DIAGNOSIS — Z00.00 HEALTH CARE MAINTENANCE: ICD-10-CM

## 2024-05-30 RX ORDER — MULTIVITAMIN WITH IRON
TABLET ORAL
Qty: 100 TABLET | Refills: 4 | Status: SHIPPED | OUTPATIENT
Start: 2024-05-30

## 2024-05-30 NOTE — TELEPHONE ENCOUNTER
[facility] sent Rx request for the following:      Requested Prescriptions   Pending Prescriptions Disp Refills    vitamin B6 (PYRIDOXINE) 100 MG tablet [Pharmacy Med Name: VITAMIN B-6 100MG TABLETS] 100 tablet 4     Sig: TAKE 1 AND 1/2 TABLETS BY MOUTH EVERY MORNING, AND 1/2 TABLET EVERY EVENING       There is no refill protocol information for this order          Last Prescription Date:   9/20/23  Last Fill Qty/Refills:         100, R-4    Last Office Visit:              2/13/23   Future Office visit:             Next 5 appointments (look out 90 days)      May 31, 2024  9:20 AM  (Arrive by 9:05 AM)  Adult Preventative Visit with Esther Moran PA-C  [facility] ([facility] ) [address]  [place] [address]  [phone]             Marcy Humphries RN on 5/30/2024 at 8:34 AM

## 2024-05-31 ENCOUNTER — OFFICE VISIT (OUTPATIENT)
Dept: FAMILY MEDICINE | Facility: OTHER | Age: 24
End: 2024-05-31
Attending: PHYSICIAN ASSISTANT
Payer: MEDICAID

## 2024-05-31 VITALS
SYSTOLIC BLOOD PRESSURE: 110 MMHG | HEART RATE: 84 BPM | BODY MASS INDEX: 28.53 KG/M2 | DIASTOLIC BLOOD PRESSURE: 68 MMHG | TEMPERATURE: 97.1 F | WEIGHT: 161 LBS | HEIGHT: 63 IN | RESPIRATION RATE: 20 BRPM

## 2024-05-31 DIAGNOSIS — G40.909 SEIZURE DISORDER (H): ICD-10-CM

## 2024-05-31 DIAGNOSIS — Z30.42 ENCOUNTER FOR SURVEILLANCE OF INJECTABLE CONTRACEPTIVE: ICD-10-CM

## 2024-05-31 DIAGNOSIS — Z23 TETANUS-DIPHTHERIA (TD) VACCINATION: ICD-10-CM

## 2024-05-31 DIAGNOSIS — Z00.00 ROUTINE GENERAL MEDICAL EXAMINATION AT A HEALTH CARE FACILITY: Primary | ICD-10-CM

## 2024-05-31 DIAGNOSIS — F91.3 OPPOSITIONAL DEFIANT DISORDER: ICD-10-CM

## 2024-05-31 DIAGNOSIS — H61.23 BILATERAL IMPACTED CERUMEN: ICD-10-CM

## 2024-05-31 DIAGNOSIS — F91.1 UNDERSOCIALIZED CONDUCT DISORDER, AGGRESSIVE TYPE: ICD-10-CM

## 2024-05-31 PROCEDURE — 90715 TDAP VACCINE 7 YRS/> IM: CPT

## 2024-05-31 PROCEDURE — G0463 HOSPITAL OUTPT CLINIC VISIT: HCPCS | Mod: 25

## 2024-05-31 PROCEDURE — 99395 PREV VISIT EST AGE 18-39: CPT | Performed by: PHYSICIAN ASSISTANT

## 2024-05-31 PROCEDURE — 99214 OFFICE O/P EST MOD 30 MIN: CPT | Mod: 25 | Performed by: PHYSICIAN ASSISTANT

## 2024-05-31 PROCEDURE — 69209 REMOVE IMPACTED EAR WAX UNI: CPT | Performed by: PHYSICIAN ASSISTANT

## 2024-05-31 RX ORDER — QUETIAPINE FUMARATE 200 MG/1
TABLET, FILM COATED ORAL
Qty: 270 TABLET | Refills: 3 | Status: SHIPPED | OUTPATIENT
Start: 2024-05-31

## 2024-05-31 RX ORDER — VITAMIN B COMPLEX
1 TABLET ORAL DAILY
Qty: 100 TABLET | Refills: 3 | Status: SHIPPED | OUTPATIENT
Start: 2024-05-31

## 2024-05-31 RX ORDER — MEDROXYPROGESTERONE ACETATE 150 MG/ML
150 INJECTION, SUSPENSION INTRAMUSCULAR
Status: ACTIVE | OUTPATIENT
Start: 2024-05-31 | End: 2025-05-26

## 2024-05-31 RX ORDER — CLONIDINE HYDROCHLORIDE 0.1 MG/1
TABLET ORAL
Qty: 180 TABLET | Refills: 3 | Status: SHIPPED | OUTPATIENT
Start: 2024-05-31

## 2024-05-31 RX ORDER — MEDROXYPROGESTERONE ACETATE 150 MG/ML
150 INJECTION, SUSPENSION INTRAMUSCULAR
Qty: 1 ML | Refills: 3 | Status: SHIPPED | OUTPATIENT
Start: 2024-05-31 | End: 2024-05-31

## 2024-05-31 SDOH — HEALTH STABILITY: PHYSICAL HEALTH: ON AVERAGE, HOW MANY MINUTES DO YOU ENGAGE IN EXERCISE AT THIS LEVEL?: 20 MIN

## 2024-05-31 SDOH — HEALTH STABILITY: PHYSICAL HEALTH: ON AVERAGE, HOW MANY DAYS PER WEEK DO YOU ENGAGE IN MODERATE TO STRENUOUS EXERCISE (LIKE A BRISK WALK)?: 4 DAYS

## 2024-05-31 ASSESSMENT — ANXIETY QUESTIONNAIRES
GAD7 TOTAL SCORE: 0
3. WORRYING TOO MUCH ABOUT DIFFERENT THINGS: NOT AT ALL
IF YOU CHECKED OFF ANY PROBLEMS ON THIS QUESTIONNAIRE, HOW DIFFICULT HAVE THESE PROBLEMS MADE IT FOR YOU TO DO YOUR WORK, TAKE CARE OF THINGS AT HOME, OR GET ALONG WITH OTHER PEOPLE: NOT DIFFICULT AT ALL
7. FEELING AFRAID AS IF SOMETHING AWFUL MIGHT HAPPEN: NOT AT ALL
GAD7 TOTAL SCORE: 0
2. NOT BEING ABLE TO STOP OR CONTROL WORRYING: NOT AT ALL
6. BECOMING EASILY ANNOYED OR IRRITABLE: NOT AT ALL
7. FEELING AFRAID AS IF SOMETHING AWFUL MIGHT HAPPEN: NOT AT ALL
8. IF YOU CHECKED OFF ANY PROBLEMS, HOW DIFFICULT HAVE THESE MADE IT FOR YOU TO DO YOUR WORK, TAKE CARE OF THINGS AT HOME, OR GET ALONG WITH OTHER PEOPLE?: NOT DIFFICULT AT ALL
5. BEING SO RESTLESS THAT IT IS HARD TO SIT STILL: NOT AT ALL
1. FEELING NERVOUS, ANXIOUS, OR ON EDGE: NOT AT ALL
GAD7 TOTAL SCORE: 0
4. TROUBLE RELAXING: NOT AT ALL

## 2024-05-31 ASSESSMENT — SOCIAL DETERMINANTS OF HEALTH (SDOH): HOW OFTEN DO YOU GET TOGETHER WITH FRIENDS OR RELATIVES?: ONCE A WEEK

## 2024-05-31 ASSESSMENT — PATIENT HEALTH QUESTIONNAIRE - PHQ9
SUM OF ALL RESPONSES TO PHQ QUESTIONS 1-9: 1
10. IF YOU CHECKED OFF ANY PROBLEMS, HOW DIFFICULT HAVE THESE PROBLEMS MADE IT FOR YOU TO DO YOUR WORK, TAKE CARE OF THINGS AT HOME, OR GET ALONG WITH OTHER PEOPLE: NOT DIFFICULT AT ALL
SUM OF ALL RESPONSES TO PHQ QUESTIONS 1-9: 1

## 2024-05-31 ASSESSMENT — PAIN SCALES - GENERAL: PAINLEVEL: NO PAIN (0)

## 2024-05-31 NOTE — PROGRESS NOTES
Preventive Care Visit  [facility]  Esther Moran PA-C, Family Medicine  May 31, 2024      Assessment & Plan       ICD-10-CM    1. Routine general medical examination at a health care facility  Z00.00       2. Encounter for surveillance of injectable contraceptive  Z30.42 medroxyPROGESTERone (DEPO-PROVERA) injection 150 mg      3. Oppositional defiant disorder  F91.3 QUEtiapine (SEROQUEL) 200 MG tablet     cloNIDine (CATAPRES) 0.1 MG tablet      4. Undersocialized conduct disorder, aggressive type  F91.1 QUEtiapine (SEROQUEL) 200 MG tablet     cloNIDine (CATAPRES) 0.1 MG tablet      5. Seizure disorder (H)  G40.909       6. Bilateral impacted cerumen  H61.23       7. Tetanus-diphtheria (Td) vaccination  Z23         Annual physical completed today, reviewed care gaps.  Kindly deferred HIV, hepatitis C and chlamydia screen today.  Staff states that Fadia is not sexually active nor has had any exposures, they will consider updating at a later date.  Last Pap was performed in 2023 she will be due for update in 2026.  Due for new prescription/update of Depo-Provera injection.  New order is placed.  Fadia will continue to receive this with our nurse injection department at [facility] every 90 days.   Oppositional defiant disorder, slight exacerbations and seen last.  Continue Seroquel at current dosing, we will increase clonidine to 1 full tablet (0.1 mg) twice daily, she will take 1 tablet in the morning at 7 AM and the other tablet at noon.  Recommend that staff closely monitor for any behavioral changes (positive and/or negative).  Report back with any questions or concerns.  Under socialize conduct disorder, increase clonidine as outlined above.  Continue Seroquel at current dose.  Medications refilled.  Seizure disorder, stable. Recommend ongoing close follow up with neurology team at [facility] in [place].   Bilateral ears with impacted cerumen, recommend cerumen flush, Fadia is  "agreeable to this. Flush performed by nursing staff, all wax removed, ear flush successful.   Vaccine counseling performed today in regards to Adacel/Tetanus Immunization. No previous immunization reactions. No recent or current illness. Vaccine kindly administered by nursing staff today.     BMI  Estimated body mass index is 28.98 kg/m  as calculated from the following:    Height as of this encounter: 1.588 m (5' 2.5\").    Weight as of this encounter: 73 kg (161 lb).   Weight management plan: Discussed healthy diet and exercise guidelines    Depression Screening Follow Up      Follow Up Actions Taken  Crisis resource information provided in the After Visit Summary    Discussed the following ways the patient can remain in a safe environment:  be around others  Counseling  Appropriate preventive services were discussed with this patient, including applicable screening as appropriate for fall prevention, nutrition, physical activity, Tobacco-use cessation, weight loss and cognition.  Checklist reviewing preventive services available has been given to the patient.  Reviewed patient's diet, addressing concerns and/or questions.     See Patient Instructions    Return in about 1 year (around 6/1/2025) for Preventive Visit.    Terrence Loaiza is a 23 year old, presenting for the following:  Physical        5/31/2024     9:19 AM   Additional Questions   Roomed by oscar rosario lpn   Accompanied by analia-guardian     Health Care Directive  Patient does not have a Health Care Directive or Living Will: Discussed advance care planning with patient; information given to patient to review.    VENKAT Loaiza presents to the clinic with Analia wilhelm for routine preventative visit.  Analia is the primary historian for visit today.    Fadia is overall feeling in good health, she is excited that she will be going to a graduation party this weekend.  She enjoys spending time with friends and family.     Oj does have some " concerns in regards to behavior.  Fadia is exhibiting self abusing behaviors such as banging her elbow or head into objects, this typically occurs in the morning time.  She also will occasionally lash out when she is in trouble.  She is currently on clonidine half tablet in the morning and 1 full tablet at noon.  Staff is wondering if they can increase her morning dose to 1 full tablet.    She continues to follow closely with [facility] neurology  for complex partial epilepsy.   Current prescriptions managed by neurology - Keppra 500 mg BID, Lamictal 300 mg BID, Klonopin 0.5 mg QPM, Klonopin ODT for seizures    Dr. PULIDO medications:  - Vitamin B6  - Seroquel 200 mg TID  - Clonidine 0.05 mg QAM and 0.1 mg QPM  - Artificial saliva for dry mouth/breath  - Vitamin D3 1000 mcg daily  - Depo injection    Health care maintenance:  Last PAP 2/13/23 - normal - due 2026  Tetanus due - last vaccine 8/29/13  Due HIV, Hep C, Chlamydia screen at some point        5/31/2024   General Health   How would you rate your overall physical health? Good   Feel stress (tense, anxious, or unable to sleep) Not at all         5/31/2024   Nutrition   Three or more servings of calcium each day? Yes   Diet: Regular (no restrictions)   How many servings of fruit and vegetables per day? 4 or more   How many sweetened beverages each day? 0-1         5/31/2024   Exercise   Days per week of moderate/strenous exercise 4 days   Average minutes spent exercising at this level 20 min         5/31/2024   Social Factors   Frequency of gathering with friends or relatives Once a week   Worry food won't last until get money to buy more No   Food not last or not have enough money for food? Yes   Do you have housing?  Yes   Are you worried about losing your housing? No   Lack of transportation? No   Unable to get utilities (heat,electricity)? No   (!) FOOD SECURITY CONCERN PRESENT      5/31/2024   Dental   Dentist two times every year? Yes          5/31/2024   TB Screening   Were you born outside of the [place]? No       Today's PHQ-9 Score:       5/31/2024     9:03 AM   PHQ-9 SCORE   PHQ-9 Total Score MyChart 1 (Minimal depression)   PHQ-9 Total Score 1         5/31/2024   Substance Use   Alcohol more than 3/day or more than 7/wk No   Do you use any other substances recreationally? No     Social History     Tobacco Use    Smoking status: Passive Smoke Exposure - Never Smoker    Smokeless tobacco: Never    Tobacco comments:     Quit smoking: Patient's mother smokes in the house   Vaping Use    Vaping status: Never Used   Substance Use Topics    Alcohol use: Never     Alcohol/week: 0.0 standard drinks of alcohol    Drug use: Never             5/31/2024   One time HIV Screening   Previous HIV test? No         5/31/2024   STI Screening   New sexual partner(s) since last STI/HIV test? No     History of abnormal Pap smear: No - age 21-29 PAP every 3 years recommended        2/13/2023     9:13 AM   PAP / HPV   PAP Negative for Intraepithelial Lesion or Malignancy (NILM)            5/31/2024   Contraception/Family Planning   Questions about contraception or family planning No        Reviewed and updated as needed this visit by Provider   Tobacco  Allergies  Meds  Problems  Med Hx  Surg Hx  Fam Hx            Past Medical History:   Diagnosis Date    Attention-deficit hyperactivity disorder     05/02/06,See Dr. Bonilla's note    Epilepsy without status epilepticus, not intractable (H)     complex partial seizures with secondary generalization triggered by fevers; followed by Dr. Hu    Oppositional defiant disorder     emotionally reactive aggressive behavior    Other disorders of psychological development      10/2005,Testing done at [facility]    Other postprocedural complications and disorders of digestive system     8 and 9    Personal history of other diseases of the female genital tract     Vaginal delivery at 39 weeks gestation    Personal history of other  "medical treatment (CODE)     No Comments Provided    Pervasive developmental disorder     05/02/06,See Dr. Bonilla's note     Past Surgical History:   Procedure Laterality Date    OTHER SURGICAL HISTORY      94483.0,PAST SURGICAL HISTORY,No surgeries.       Review of Systems  Constitutional, HEENT, cardiovascular, pulmonary, GI, , musculoskeletal, neuro, skin, endocrine and psych systems are negative, except as otherwise noted.     Objective    Exam  /68 (BP Location: Right arm, Patient Position: Sitting, Cuff Size: Adult Regular)   Pulse 84   Temp 97.1  F (36.2  C) (Tympanic)   Resp 20   Ht 1.588 m (5' 2.5\")   Wt 73 kg (161 lb)   BMI 28.98 kg/m     Estimated body mass index is 28.98 kg/m  as calculated from the following:    Height as of this encounter: 1.588 m (5' 2.5\").    Weight as of this encounter: 73 kg (161 lb).    Physical Exam  GENERAL: alert and no distress  EYES: Eyes grossly normal to inspection  HENT: normal cephalic/atraumatic, both ears: occluded with wax, nose and mouth without ulcers or lesions, oropharynx clear, and oral mucous membranes moist  NECK: no adenopathy, no asymmetry, masses, or scars  RESP: lungs clear to auscultation - no rales, rhonchi or wheezes  CV: regular rate and rhythm, normal S1 S2, no S3 or S4, no murmur, click or rub, no peripheral edema  MS: no gross musculoskeletal defects noted, no edema  SKIN: no suspicious lesions or rashes  PSYCH: affect normal/bright and appearance well groomed  LYMPH: normal ant/post cervical, supraclavicular nodes    Signed Electronically by: Esther Moran PA-C    Answers submitted by the patient for this visit:  Patient Health Questionnaire (Submitted on 5/31/2024)  If you checked off any problems, how difficult have these problems made it for you to do your work, take care of things at home, or get along with other people?: Not difficult at all  PHQ9 TOTAL SCORE: 1  MIGUEL ANGEL-7 (Submitted on 5/31/2024)  MIGUEL ANGEL 7 TOTAL SCORE: 0    "

## 2024-05-31 NOTE — NURSING NOTE
The ear canal was irrigated withbody-temperature tap water with the jet of water directed superiorly.  The ear canal was then re-examined and cleared of the impaction.  The patient tolerated the procedure well.  Jeanine Lopez LPN ....................5/31/2024   10:17 AM

## 2024-05-31 NOTE — NURSING NOTE
"Patient presents to the clinic for physical.    FOOD SECURITY SCREENING QUESTIONS:    The next two questions are to help us understand your food security.  If you are feeling you need any assistance in this area, we have resources available to support you today.    Hunger Vital Signs:  Within the past 12 months we worried whether our food would run out before we got money to buy more. Never  Within the past 12 months the food we bought just didn't last and we didn't have money to get more. Never    Advance Care Directive on file? no  Advance Care Directive provided to patient? N/a.    Chief Complaint   Patient presents with    Physical       Initial /68 (BP Location: Right arm, Patient Position: Sitting, Cuff Size: Adult Regular)   Pulse 84   Temp 97.1  F (36.2  C) (Tympanic)   Resp 20   Ht 1.588 m (5' 2.5\")   Wt 73 kg (161 lb)   BMI 28.98 kg/m   Estimated body mass index is 28.98 kg/m  as calculated from the following:    Height as of this encounter: 1.588 m (5' 2.5\").    Weight as of this encounter: 73 kg (161 lb).  Medication Reconciliation: complete        Jeanine Lopez LPN     "

## 2024-05-31 NOTE — PATIENT INSTRUCTIONS
"Preventive Care Advice   This is general advice we often give to help people stay healthy. Your care team may have specific advice just for you. Please talk to your care team about your own preventive care needs.  Lifestyle  Exercise at least 150 minutes each week (30 minutes a day, 5 days a week).  Do muscle strengthening activities 2 days a week. These help control your weight and prevent disease.  No smoking.  Wear sunscreen to prevent skin cancer.  Have your home tested for radon every 2 to 5 years. Radon is a colorless, odorless gas that can harm your lungs. To learn more, go to www.health.[facility].[place]. and search for \"Radon in Homes.\"  Keep guns unloaded and locked up in a safe place like a safe or gun vault, or, use a gun lock and hide the keys. Always lock away bullets separately. To learn more, visit [employer].[place].gov and search for \"safe gun storage.\"  Nutrition  Eat 5 or more servings of fruits and vegetables each day.  Try wheat bread, brown rice and whole grain pasta (instead of white bread, rice, and pasta).  Get enough calcium and vitamin D. Check the label on foods and aim for 100% of the RDA (recommended daily allowance).  Regular exams  Have a dental exam and cleaning every 6 months.  See your health care team every year to talk about:  Any changes in your health.  Any medicines your care team has prescribed.  Preventive care, family planning, and ways to prevent chronic diseases.  Shots (vaccines)   HPV shots (up to age 26), if you've never had them before.  Hepatitis B shots (up to age 59), if you've never had them before.  COVID-19 shot: Get this shot when it's due.  Flu shot: Get a flu shot every year.  Tetanus shot: Get a tetanus shot every 10 years.  Pneumococcal, hepatitis A, and RSV shots: Ask your care team if you need these based on your risk.  Shingles shot (for age 50 and up).  General health tests  Diabetes screening:  Starting at age 35, Get screened for diabetes at least every 3 years.  If " you are younger than age 35, ask your care team if you should be screened for diabetes.  Cholesterol test: At age 39, start having a cholesterol test every 5 years, or more often if advised.  Bone density scan (DEXA): At age 50, ask your care team if you should have this scan for osteoporosis (brittle bones).  Hepatitis C: Get tested at least once in your life.  Abdominal aortic aneurysm screening: Talk to your doctor about having this screening if you:  Have ever smoked; and  Are biologically male; and  Are between the ages of 65 and 75.  STIs (sexually transmitted infections)  Before age 24: Ask your care team if you should be screened for STIs.  After age 24: Get screened for STIs if you're at risk. You are at risk for STIs (including HIV) if:  You are sexually active with more than one person.  You don't use condoms every time.  You or a partner was diagnosed with a sexually transmitted infection.  If you are at risk for HIV, ask about PrEP medicine to prevent HIV.  Get tested for HIV at least once in your life, whether you are at risk for HIV or not.  Cancer screening tests  Cervical cancer screening: If you have a cervix, begin getting regular cervical cancer screening tests at age 21. Most people who have regular screenings with normal results can stop after age 65. Talk about this with your provider.  Breast cancer scan (mammogram): If you've ever had breasts, begin having regular mammograms starting at age 40. This is a scan to check for breast cancer.  Colon cancer screening: It is important to start screening for colon cancer at age 45.  Have a colonoscopy test every 10 years (or more often if you're at risk) Or, ask your provider about stool tests like a FIT test every year or Cologuard test every 3 years.  To learn more about your testing options, visit: www.[employer]/300435.pdf.  For help making a decision, visit: dania/qm57655.  Prostate cancer screening test: If you have a prostate and are age 55  to 69, ask your provider if you would benefit from a yearly prostate cancer screening test.  Lung cancer screening: If you are a current or former smoker age 50 to 80, ask your care team if ongoing lung cancer screenings are right for you.  For informational purposes only. Not to replace the advice of your health care provider. Copyright   2023 [employer]. All rights reserved. Clinically reviewed by the [facility]. [employer] 194634 - REV 04/24.

## 2024-05-31 NOTE — COMMUNITY RESOURCES LIST (ENGLISH)
May 31, 2024           YOUR PERSONALIZED LIST OF SERVICES & PROGRAMS           NAVIGATION    Eligibility Screening      [facility] application assistance  [address] [place] [address] (Distance: 5.3 miles)  Phone: [phone]  Language: English  Fee: Free      [facility] application assistance  [address] [place] [address] (Distance: 5.3 miles)  Phone: [phone]  Language: English  Fee: Free      [employer] [place] - SNAP (formerly food stamps) Screening and Application help  Phone: (360) [phone]  Website: https://www.MONTAJ.org/programs/[place]-food-helpline/  Language: English  Hours: Mon 10:00 AM - 5:00 PM Tue 10:00 AM - 5:00 PM Wed 10:00 AM - 5:00 PM Thu 10:00 AM - 5:00 PM Fri 10:00 AM - 5:00 PM  Fee: Free  Accessibility: Ada accessible, Blind accommodation, Deaf or hard of hearing, Translation services        ASSISTANCE    Nutrition Benefits      [facility] application assistance  [address] [place] [address] (Distance: 5.3 miles)  Phone: [phone]  Language: English  Fee: Free      [employer] - SNAP application assistance  [address] [address] (Distance: 5.3 miles)  Language: English  Fee: Free      [employer] [place] - SNAP (formerly food stamps) Screening and Application help  Phone: (671) 557-4568  Website: https://www.MONTAJ.org/programs/[place]-food-helpline/  Language: English  Hours: Mon 10:00 AM - 5:00 PM Tue 10:00 AM - 5:00 PM Wed 10:00 AM - 5:00 PM Thu 10:00 AM - 5:00 PM Fri 10:00 AM - 5:00 PM  Fee: Free  Accessibility: Ada accessible, Blind accommodation, Deaf or hard of hearing, Translation services    Pantry      [facility] - Food pantry  [address] [place] [address] (Distance: 6.6 miles)  Phone: ([phone]) [phone]  Website: https://[employer]  Language: English  Fee: Free  Accessibility: Translation services, Ada  [facility] - Food pantry  [address] [place] [address] (Distance: 16.2 miles)  Phone: [phone]) [phone]  Website: https://www.Birdi.com/Jayla/  Language: English  Fee: Free      [facility]  Phone: (883) [phone]  Website: www.[employer]  Language: English, Thai  Hours: Mon 9:00 AM - 3:30 PM Tue 9:00 AM - 6:30 PM Wed 9:00 AM - 3:30 PM Thu 9:00 AM - 12:30 PM Fri 9:00 AM - 12:30 PM Sat 9:00 AM - 12:00 PM  Fee: Free               IMPORTANT NUMBERS & WEBSITES        Emergency Services  911  .   [facility]  211 http://211unitedway.org  .   Poison Control  (766) 245-2219 http://[place]poison.org http://wis[place]poison.org  .     Suicide and Crisis Lifeline  988 http://988lifeline.org  .   Childhelp [place] Child Abuse Hotline  318.993.1410 http://Childhelphotline.org   .   National Sexual Assault Hotline  (201) 137-4037 (HOPE) http://[employer].org   .     National Runaway Safeline  (930) 668-3707 (RUNAWAY) http://TRAFFIQruTermScout.org  .   Pregnancy & Postpartum Support  Call/text 262-932-9307  [place]: http://ppsupportmn.org  [place]: http://[employer].com/wi  .   Substance Abuse National Helpline ([facility])  661-365-HELP (2783) http://Findtreatment.gov   .                DISCLAIMER: These resources have been generated via the [employer] Platform. [employer] does not endorse any service providers mentioned in this resource list. [employer] does not guarantee that the services mentioned in this resource list will be available to you or will improve your health or wellness.    [facility]

## 2024-06-20 ENCOUNTER — TELEPHONE (OUTPATIENT)
Dept: FAMILY MEDICINE | Facility: OTHER | Age: 24
End: 2024-06-20
Payer: MEDICAID

## 2024-06-20 NOTE — TELEPHONE ENCOUNTER
Physical form received.  Last physical printed and MIIC printed.      Jeanine Lopez LPN 6/20/2024 8:35 AM

## 2024-07-19 ENCOUNTER — ALLIED HEALTH/NURSE VISIT (OUTPATIENT)
Dept: FAMILY MEDICINE | Facility: OTHER | Age: 24
End: 2024-07-19
Attending: STUDENT IN AN ORGANIZED HEALTH CARE EDUCATION/TRAINING PROGRAM
Payer: MEDICAID

## 2024-07-19 DIAGNOSIS — Z30.42 ENCOUNTER FOR DEPO-PROVERA CONTRACEPTION: Primary | ICD-10-CM

## 2024-07-19 PROCEDURE — 250N000011 HC RX IP 250 OP 636: Mod: JZ | Performed by: PHYSICIAN ASSISTANT

## 2024-07-19 PROCEDURE — 96372 THER/PROPH/DIAG INJ SC/IM: CPT | Performed by: PHYSICIAN ASSISTANT

## 2024-07-19 RX ADMIN — MEDROXYPROGESTERONE ACETATE 150 MG: 150 INJECTION, SUSPENSION INTRAMUSCULAR at 08:23

## 2024-07-19 NOTE — PROGRESS NOTES
Clinic Administered Medication Documentation      Depo Provera Documentation    Depo-Provera Standing Order inclusion/exclusion criteria reviewed.     Is this the initial or subsequent dose of Depo Provera? Subsequent dose - patient is within the acceptable window of time (11-15 weeks) for subsequent injection. Pregnancy test not indicated.    Patient meets: inclusion criteria     Is there an active order (written within the past 365 days, with administrations remaining, not ) in the chart? Yes.     Prior to injection, verified patient identity using patient's name and date of birth. Medication was administered. Please see MAR and medication order for additional information.     Vial/Syringe: Single dose vial. Was entire vial of medication used? Yes    Patient instructed to remain in clinic for 15 minutes and report any adverse reaction to staff immediately but patient declined.  NEXT INJECTION DUE: 10/4/24 - 24    Verified that the patient has refills remaining in their prescription.    Krystyna Lay RN on 2024 at 8:20 AM

## 2024-10-14 ENCOUNTER — ALLIED HEALTH/NURSE VISIT (OUTPATIENT)
Dept: FAMILY MEDICINE | Facility: OTHER | Age: 24
End: 2024-10-14
Attending: STUDENT IN AN ORGANIZED HEALTH CARE EDUCATION/TRAINING PROGRAM
Payer: MEDICAID

## 2024-10-14 VITALS — TEMPERATURE: 98.2 F

## 2024-10-14 DIAGNOSIS — Z23 ENCOUNTER FOR IMMUNIZATION: Primary | ICD-10-CM

## 2024-10-14 PROCEDURE — 96372 THER/PROPH/DIAG INJ SC/IM: CPT | Performed by: PHYSICIAN ASSISTANT

## 2024-10-14 PROCEDURE — 90656 IIV3 VACC NO PRSV 0.5 ML IM: CPT

## 2024-10-14 PROCEDURE — 90480 ADMN SARSCOV2 VAC 1/ONLY CMP: CPT

## 2024-10-14 PROCEDURE — 250N000011 HC RX IP 250 OP 636: Mod: JZ | Performed by: PHYSICIAN ASSISTANT

## 2024-10-14 PROCEDURE — G0008 ADMIN INFLUENZA VIRUS VAC: HCPCS

## 2024-10-14 RX ADMIN — MEDROXYPROGESTERONE ACETATE 150 MG: 150 INJECTION, SUSPENSION INTRAMUSCULAR at 09:19

## 2024-10-14 NOTE — PROGRESS NOTES
Prior to immunization administration, verified patients identity using patient s name and date of birth. Please see Immunization Activity for additional information.     Is the patient's temperature normal (100.5 or less)? Yes     Patient MEETS CRITERIA. PROCEED with vaccine administration.      Patient instructed to remain in clinic for 15 minutes afterwards, and to report any adverse reactions.      Link to Ancillary Visit Immunization Standing Orders SmartSet     Screening performed by Melo Flower RN on 10/14/2024 at 9:17 AM.      Clinic Administered Medication Documentation      Depo Provera Documentation    Depo-Provera Standing Order inclusion/exclusion criteria reviewed.     Is this the initial or subsequent dose of Depo Provera? Subsequent dose - patient is within the acceptable window of time (11-15 weeks) for subsequent injection. Pregnancy test not indicated.    Patient meets: inclusion criteria     Is there an active order (written within the past 365 days, with administrations remaining, not ) in the chart? Yes.     Prior to injection, verified patient identity using patient's name and date of birth. Medication was administered. Please see MAR and medication order for additional information.     Vial/Syringe: Single dose vial. Was entire vial of medication used? Yes    Patient instructed to remain in clinic for 15 minutes and report any adverse reaction to staff immediately.  NEXT INJECTION DUE: 24 - 25    Verified that the patient has refills remaining in their prescription.

## 2025-05-01 ENCOUNTER — PATIENT OUTREACH (OUTPATIENT)
Dept: CARE COORDINATION | Facility: CLINIC | Age: 25
End: 2025-05-01
Payer: MEDICAID

## 2025-05-30 DIAGNOSIS — F91.1 UNDERSOCIALIZED CONDUCT DISORDER, AGGRESSIVE TYPE: ICD-10-CM

## 2025-05-30 DIAGNOSIS — F91.3 OPPOSITIONAL DEFIANT DISORDER: ICD-10-CM

## 2025-06-03 RX ORDER — QUETIAPINE FUMARATE 200 MG/1
TABLET, FILM COATED ORAL
Qty: 270 TABLET | Refills: 0 | Status: CANCELLED | OUTPATIENT
Start: 2025-06-03

## 2025-06-03 NOTE — TELEPHONE ENCOUNTER
[facility] sent Rx request for the following:      Requested Prescriptions   Pending Prescriptions Disp Refills    QUEtiapine (SEROQUEL) 200 MG tablet 270 tablet 3     Sig: TAKE 1 TABLET(200 MG) BY MOUTH THREE TIMES DAILY       Antipsychotic Medications Failed - 6/3/2025  8:37 AM        Failed - Most recent blood pressure under 140/90 in past 12 months        Failed - Lipid panel on file within the past 12 months     Recent Labs   Lab Test 02/21/20  0953   CHOL 137   TRIG 89   HDL 36   LDL 83   NHDL 101               Failed - Heart Rate on file within past 12 months     Pulse Readings from Last 3 Encounters:   05/31/24 84   10/12/23 96   02/13/23 111               Failed - A1c or Glucose on file in past 12 months     Recent Labs   Lab Test 02/21/22  0914 02/21/20  0953   GLC 86 131*   A1C  --  4.4       Please review patients last 3 weights. If a weight gain of >10 lbs exists, you may refill the prescription once after instructing the patient to schedule an appointment within the next 30 days.    Wt Readings from Last 3 Encounters:   05/31/24 73 kg (161 lb)   10/12/23 75.4 kg (166 lb 4 oz)   08/10/23 76.7 kg (169 lb 3.2 oz)             Failed - Recent (12 month) or future (90 days) visit with authorizing provider's specialty (provided they have been seen in the past 15 months)     The patient must have completed an in-person or virtual visit within the past 12 months or has a future visit scheduled within the next 90 days with the authorizing provider s specialty.  Urgent care and e-visits do not qualify as an office visit for this protocol.          Failed - Medication indicated for associated diagnosis     Medication is associated with one or more of the following diagnoses:             Agitation            Autistic disorder            Bipolar disorder            Chemotherapy-induced nausea and vomiting            Delirium            Depression            Schizophrenia             Mood disorder          Oppositional defiant disorder [F91.3]      Undersocialized conduct disorder, aggressive type [F91.1]        Last Prescription Date:   5/31/24  Last Fill Qty/Refills:         270, R-3    Last Office Visit:              5/31/24 (px)   Future Office visit:           None    Unable to complete prescription refill per RN Medication Refill Policy.     Pt due for annual. Routing to provider for refill consideration. Routing to Unit scheduling pool, to assist Pt in scheduling appointment.     Colin Underwood RN on 6/3/2025 at 8:38 AM

## 2025-06-05 DIAGNOSIS — F91.1 UNDERSOCIALIZED CONDUCT DISORDER, AGGRESSIVE TYPE: ICD-10-CM

## 2025-06-05 DIAGNOSIS — F91.3 OPPOSITIONAL DEFIANT DISORDER: ICD-10-CM

## 2025-06-05 RX ORDER — QUETIAPINE FUMARATE 200 MG/1
TABLET, FILM COATED ORAL
Qty: 90 TABLET | Refills: 0 | OUTPATIENT
Start: 2025-06-05

## 2025-06-05 RX ORDER — QUETIAPINE FUMARATE 200 MG/1
TABLET, FILM COATED ORAL
Qty: 90 TABLET | Refills: 0 | Status: SHIPPED | OUTPATIENT
Start: 2025-06-05

## 2025-06-05 NOTE — TELEPHONE ENCOUNTER
This dose is a very high dose of Seroquel and patient has not seen Dr. Lou Mata in over a year.  30-day supply is sent in and I would recommend setting up a follow-up with Dr. Lou Mata in the next month.  Please assist in scheduling.

## 2025-06-05 NOTE — TELEPHONE ENCOUNTER
Called patient to schedule their annual physical - sent phone note back to refill team about Seroquel to see if patient can see Philly or other provider to get 60 day refill before 6/25/25

## 2025-06-05 NOTE — TELEPHONE ENCOUNTER
[facility] sent Rx request for the following:      Requested Prescriptions   Pending Prescriptions Disp Refills    QUEtiapine (SEROQUEL) 200 MG tablet 270 tablet 3     Sig: TAKE 1 TABLET(200 MG) BY MOUTH THREE TIMES DAILY       Antipsychotic Medications Failed - 6/5/2025 10:55 AM        Failed - Most recent blood pressure under 140/90 in past 12 months        Failed - Lipid panel on file within the past 12 months     Recent Labs   Lab Test 02/21/20  0953   CHOL 137   TRIG 89   HDL 36   LDL 83   NHDL 101               Failed - Heart Rate on file within past 12 months     Pulse Readings from Last 3 Encounters:   05/31/24 84   10/12/23 96   02/13/23 111               Failed - A1c or Glucose on file in past 12 months     Recent Labs   Lab Test 02/21/22  0914 02/21/20  0953   GLC 86 131*   A1C  --  4.4       Please review patients last 3 weights. If a weight gain of >10 lbs exists, you may refill the prescription once after instructing the patient to schedule an appointment within the next 30 days.    Wt Readings from Last 3 Encounters:   05/31/24 73 kg (161 lb)   10/12/23 75.4 kg (166 lb 4 oz)   08/10/23 76.7 kg (169 lb 3.2 oz)             Failed - Recent (12 month) or future (90 days) visit with authorizing provider's specialty (provided they have been seen in the past 15 months)     The patient must have completed an in-person or virtual visit within the past 12 months or has a future visit scheduled within the next 90 days with the authorizing provider s specialty.  Urgent care and e-visits do not qualify as an office visit for this protocol.          Failed - Medication indicated for associated diagnosis     Medication is associated with one or more of the following diagnoses:             Agitation            Autistic disorder            Bipolar disorder            Chemotherapy-induced nausea and vomiting            Delirium            Depression            Schizophrenia             Mood disorder         Last  Prescription Date:   05/31/24  Last Fill Qty/Refills:         270, R-3    Last Office Visit:              05/31/24 (Dandre)   Future Office visit:            None    Unable to complete prescription refill per RN Medication Refill Policy. Pt due for annual exam. Routing to provider for refill consideration. Routing to Unit scheduling pool, to assist Pt in scheduling appointment.    Rosa Chacon RN on 6/5/2025 at 10:59 AM

## 2025-06-05 NOTE — TELEPHONE ENCOUNTER
Reason for call: Medication or medication refill    Have you contacted your pharmacy regarding this refill? YES    Name of medication requested: quetiapine    How many days of medication do you have left? FOUR    What pharmacy do you use? [facility] [place]    Preferred method for responding to this message: Telephone Call    Phone number patient can be reached at: Other phone number: Cyn: [phone]     If we cannot reach you directly, may we leave a detailed response at the number you provided? No      Cyn called on behalf of the patient and stated the pharmacy has yet to hear back.      Madison Angelo on 6/5/2025 at 9:57 AM

## 2025-06-05 NOTE — TELEPHONE ENCOUNTER
Reason for call: Medication or medication refill    Have you contacted your pharmacy regarding this refill? Yes    If No, direct the patient to contact the Pharmacy and discontinue telephone note using Erroneous Encounter.  If Yes, continue.    Name of medication requested: Seroquel    How many days of medication do you have left? 3    What pharmacy do you use? [facility]    Preferred method for responding to this message: Telephone Call    Phone number patient can be reached at: Cell number on file:    Telephone Information:   Mobile [phone]       If we cannot reach you directly, may we leave a detailed response at the number you provided? Yes    Scheduled patient for her next annual visit with Esther Fraga - PCP has changed from Luis Armando Mata to Esther Fraga. Patients guardian stated that Fadia is going on two trips this summer where she will need her Seroquel medication. Medication was just refilled today for a 30 day supply and has 3 days left of current supply. Note said to follow up with Luis Armando Mata regarding the Seroquel since it is a high dose. Since ASHOK is no longer PCP, is it okay for patient to see Esther Fraga regarding the Seroquel? If patient has to be seen before getting another med refill/60 day supply, I am just wondering who to schedule with. Patient is wanting a 60 day supply of Seroquel since she will be gone 6/25/2025-7/5/2025 and then from 7/24/2025-8/6/2025 and does not want to run out while she's gone. Catherine Husain on 6/5/2025 at 3:19 PM

## 2025-06-10 DIAGNOSIS — E55.9 VITAMIN D DEFICIENCY: Primary | ICD-10-CM

## 2025-06-10 RX ORDER — VITAMIN B COMPLEX
1 TABLET ORAL DAILY
Qty: 100 TABLET | Refills: 3 | Status: SHIPPED | OUTPATIENT
Start: 2025-06-10

## 2025-06-16 DIAGNOSIS — Z30.42 ENCOUNTER FOR DEPO-PROVERA CONTRACEPTION: Primary | ICD-10-CM

## 2025-06-16 RX ORDER — MEDROXYPROGESTERONE ACETATE 150 MG/ML
150 INJECTION, SUSPENSION INTRAMUSCULAR
Status: ACTIVE | OUTPATIENT
Start: 2025-06-24

## 2025-06-19 DIAGNOSIS — F91.1 UNDERSOCIALIZED CONDUCT DISORDER, AGGRESSIVE TYPE: ICD-10-CM

## 2025-06-19 DIAGNOSIS — F91.3 OPPOSITIONAL DEFIANT DISORDER: ICD-10-CM

## 2025-06-19 DIAGNOSIS — Z00.00 HEALTH CARE MAINTENANCE: ICD-10-CM

## 2025-06-19 RX ORDER — MULTIVITAMIN WITH IRON
TABLET ORAL
Qty: 100 TABLET | Refills: 0 | Status: SHIPPED | OUTPATIENT
Start: 2025-06-19

## 2025-06-19 RX ORDER — CLONIDINE HYDROCHLORIDE 0.1 MG/1
TABLET ORAL
Qty: 180 TABLET | Refills: 0 | Status: SHIPPED | OUTPATIENT
Start: 2025-06-19

## 2025-06-19 NOTE — TELEPHONE ENCOUNTER
Reason for call: Medication or medication refill    Have you contacted your pharmacy regarding this refill? yes    If No, direct the patient to contact the Pharmacy and discontinue telephone note using Erroneous Encounter.  If Yes, continue.    Name of medication requested: Clonidine and vitamin B6    How many days of medication do you have left? Through next Tuesday but she is going on a trip    What pharmacy do you use? [facility]     Preferred method for responding to this message: Telephone Call    Phone number patient can be reached at: Cell number on file:    Telephone Information:   Mobile [phone]       If we cannot reach you directly, may we leave a detailed response at the number you provided? Yes

## 2025-06-19 NOTE — TELEPHONE ENCOUNTER
Patient sent Rx request for the following:      Requested Prescriptions   Pending Prescriptions Disp Refills    cloNIDine (CATAPRES) 0.1 MG tablet 180 tablet 3     Sig: Take 1 tablet (0.1 mg) by mouth in the morning at 7 am and 1 tablet (0.1 mg) by mouth at noon       Antihypertensive Agents Failed - 6/19/2025  9:36 AM        Failed - Most recent blood pressure under 140/90 in past 12 months     BP Readings from Last 3 Encounters:   05/31/24 110/68   10/12/23 110/64   08/10/23 132/74       No data recorded            Failed - Has GFR on file in past 12 months and most recent value is normal   Last Prescription Date:   5/31/24  Last Fill Qty/Refills:         180, R-3    Last Office Visit:              5/31/24        vitamin B6 (PYRIDOXINE) 100 MG tablet 100 tablet 4     Sig: TAKE 1 AND 1/2 TABLETS BY MOUTH EVERY MORNING AND 1/2 TABLET EVERY EVENING       There is no refill protocol information for this order          Last Prescription Date:   5/30/24  Last Fill Qty/Refills:         100, R-4    Last Office Visit:              5/31/24   Future Office visit:           7/17/25  Next 5 appointments (look out 90 days)      Jun 24, 2025 8:30 AM  (Arrive by 8:15 AM)  Nurse Visit with  INJECTION NURSE  [facility] ([facility]) [address]  [place] [address]  [phone]     Jul 17, 2025 9:00 AM  (Arrive by 8:45 AM)  Adult Preventative Visit with Esther Fraga PA-C  [facility] ([facility]) [address]  [place] [address]  [phone]           Unable to complete prescription refill per RN Medication Refill Policy. Routing to provider for refill consideration  Ariella Butcher RN on 6/19/2025 at 9:39 AM

## 2025-06-24 ENCOUNTER — ALLIED HEALTH/NURSE VISIT (OUTPATIENT)
Dept: FAMILY MEDICINE | Facility: OTHER | Age: 25
End: 2025-06-24
Attending: PHYSICIAN ASSISTANT
Payer: MEDICAID

## 2025-06-24 DIAGNOSIS — Z30.42 ENCOUNTER FOR DEPO-PROVERA CONTRACEPTION: Primary | ICD-10-CM

## 2025-06-24 PROCEDURE — 96372 THER/PROPH/DIAG INJ SC/IM: CPT | Performed by: PHYSICIAN ASSISTANT

## 2025-06-24 PROCEDURE — 250N000011 HC RX IP 250 OP 636: Mod: JZ | Performed by: PHYSICIAN ASSISTANT

## 2025-06-24 RX ADMIN — MEDROXYPROGESTERONE ACETATE 150 MG: 150 INJECTION, SUSPENSION INTRAMUSCULAR at 08:31

## 2025-06-24 NOTE — PROGRESS NOTES
Clinic Administered Medication Documentation      Depo Provera Documentation    Depo-Provera Standing Order inclusion/exclusion criteria reviewed.     Is this the initial or subsequent dose of Depo Provera? Subsequent dose - patient is within the acceptable window of time (11-15 weeks) for subsequent injection. Pregnancy test not indicated.    Patient meets: inclusion criteria     Is there an active order (written within the past 365 days, with administrations remaining, not ) in the chart? Yes.     Prior to injection, verified patient identity using patient's name and date of birth. Medication was administered. Please see MAR and medication order for additional information.     Vial/Syringe: Single dose vial. Was entire vial of medication used? Yes    Patient instructed to remain in clinic for 15 minutes and report any adverse reaction to staff immediately.  NEXT INJECTION DUE: 25 - 10/7/25    Verified that the patient has refills remaining in their prescription.  Melo Flower RN on 2025 at 8:30 AM

## 2025-06-25 DIAGNOSIS — F91.3 OPPOSITIONAL DEFIANT DISORDER: ICD-10-CM

## 2025-06-25 DIAGNOSIS — F91.1 UNDERSOCIALIZED CONDUCT DISORDER, AGGRESSIVE TYPE: ICD-10-CM

## 2025-06-25 NOTE — TELEPHONE ENCOUNTER
Reason for call: Medication or medication refill    Have you contacted your pharmacy regarding this refill? Yes         Name of medication requested: quetiapine    How many days of medication do you have left? 14    What pharmacy do you use? [facility] [place]    Preferred method for responding to this message: Telephone Call    Phone number patient can be reached at: Cell number on file:    Telephone Information:   Mobile [phone]       If we cannot reach you directly, may we leave a detailed response at the number you provided? Yes     Cristal Reeves on 6/25/2025 at 11:26 AM

## 2025-06-30 DIAGNOSIS — F91.3 OPPOSITIONAL DEFIANT DISORDER: ICD-10-CM

## 2025-06-30 DIAGNOSIS — F91.1 UNDERSOCIALIZED CONDUCT DISORDER, AGGRESSIVE TYPE: ICD-10-CM

## 2025-06-30 RX ORDER — QUETIAPINE FUMARATE 200 MG/1
TABLET, FILM COATED ORAL
Qty: 100 TABLET | Refills: 4 | Status: SHIPPED | OUTPATIENT
Start: 2025-06-30

## 2025-07-02 ENCOUNTER — TELEPHONE (OUTPATIENT)
Dept: FAMILY MEDICINE | Facility: OTHER | Age: 25
End: 2025-07-02
Payer: MEDICAID

## 2025-07-02 RX ORDER — QUETIAPINE FUMARATE 200 MG/1
200 TABLET, FILM COATED ORAL
Qty: 100 TABLET | Refills: 0 | OUTPATIENT
Start: 2025-07-02

## 2025-07-02 NOTE — TELEPHONE ENCOUNTER
Duplicate  Filled 6/30/2025 100t 4 r  at [facility]  Melo Flower RN on 7/2/2025 at 1:42 PM

## 2025-07-02 NOTE — TELEPHONE ENCOUNTER
Cyn returned call.  Informed Cyn that Quetiapine was refilled to [facility] on 6/30/25 100 tablets with 4 refills.  Cyn will reach out to [facility].  Sandy Rea RN on 7/2/2025 at 11:53 AM      Called and left message for Cyn to return call.  Sandy Rea RN on 7/2/2025 at 11:47 AM      Called and spoke with lucio jean and Cyn has co guardianship of patient so it is okay to speak with Cyn in regards to patient.    Lucio jean will update chart so it is easier to see guardianship.  Sandy Rea RN on 7/2/2025 at 11:47 AM

## 2025-07-02 NOTE — TELEPHONE ENCOUNTER
Reason for call: Medication or medication refill    Have you contacted your pharmacy regarding this refill? YES    Name of medication requested: quetiapine    How many days of medication do you have left? SIX    What pharmacy do you use? [facility]    Preferred method for responding to this message: Telephone Call    Phone number patient can be reached at: Other phone number:  Cyn KEE: [phone]    If we cannot reach you directly, may we leave a detailed response at the number you provided? No      Cyn called on behalf of the patient and stated the previous refill request was denied however the patient only has enough medication until the July 9th and the patient will not see the provider until July 17th.      Madison Angelo on 7/2/2025 at 9:50 AM

## 2025-07-17 ENCOUNTER — RESULTS FOLLOW-UP (OUTPATIENT)
Dept: FAMILY MEDICINE | Facility: OTHER | Age: 25
End: 2025-07-17

## 2025-07-17 ENCOUNTER — OFFICE VISIT (OUTPATIENT)
Dept: FAMILY MEDICINE | Facility: OTHER | Age: 25
End: 2025-07-17
Attending: PHYSICIAN ASSISTANT
Payer: MEDICAID

## 2025-07-17 VITALS
DIASTOLIC BLOOD PRESSURE: 70 MMHG | WEIGHT: 163 LBS | TEMPERATURE: 98.2 F | HEIGHT: 63 IN | HEART RATE: 100 BPM | RESPIRATION RATE: 20 BRPM | BODY MASS INDEX: 28.88 KG/M2 | SYSTOLIC BLOOD PRESSURE: 110 MMHG

## 2025-07-17 DIAGNOSIS — Z00.00 HEALTH CARE MAINTENANCE: ICD-10-CM

## 2025-07-17 DIAGNOSIS — Z00.00 ROUTINE GENERAL MEDICAL EXAMINATION AT A HEALTH CARE FACILITY: Primary | ICD-10-CM

## 2025-07-17 DIAGNOSIS — L84 CALLUS OF FOOT: ICD-10-CM

## 2025-07-17 DIAGNOSIS — F91.1 UNDERSOCIALIZED CONDUCT DISORDER, AGGRESSIVE TYPE: ICD-10-CM

## 2025-07-17 DIAGNOSIS — F91.3 OPPOSITIONAL DEFIANT DISORDER: ICD-10-CM

## 2025-07-17 DIAGNOSIS — Z13.220 LIPID SCREENING: ICD-10-CM

## 2025-07-17 LAB
ALBUMIN SERPL BCG-MCNC: 4.9 G/DL (ref 3.5–5.2)
ALP SERPL-CCNC: 61 U/L (ref 40–150)
ALT SERPL W P-5'-P-CCNC: 20 U/L (ref 0–50)
ANION GAP SERPL CALCULATED.3IONS-SCNC: 12 MMOL/L (ref 7–15)
AST SERPL W P-5'-P-CCNC: 24 U/L (ref 0–45)
BASOPHILS # BLD AUTO: 0 10E3/UL (ref 0–0.2)
BASOPHILS NFR BLD AUTO: 0 %
BILIRUB SERPL-MCNC: 0.4 MG/DL
BUN SERPL-MCNC: 18.5 MG/DL (ref 6–20)
CALCIUM SERPL-MCNC: 10.4 MG/DL (ref 8.8–10.4)
CHLORIDE SERPL-SCNC: 102 MMOL/L (ref 98–107)
CHOLEST SERPL-MCNC: 175 MG/DL
CREAT SERPL-MCNC: 0.87 MG/DL (ref 0.51–0.95)
EGFRCR SERPLBLD CKD-EPI 2021: >90 ML/MIN/1.73M2
EOSINOPHIL # BLD AUTO: 0.1 10E3/UL (ref 0–0.7)
EOSINOPHIL NFR BLD AUTO: 2 %
ERYTHROCYTE [DISTWIDTH] IN BLOOD BY AUTOMATED COUNT: 11.8 % (ref 10–15)
FASTING STATUS PATIENT QL REPORTED: NO
FASTING STATUS PATIENT QL REPORTED: NO
GLUCOSE SERPL-MCNC: 100 MG/DL (ref 70–99)
HCO3 SERPL-SCNC: 25 MMOL/L (ref 22–29)
HCT VFR BLD AUTO: 40.6 % (ref 35–47)
HDLC SERPL-MCNC: 52 MG/DL
HGB BLD-MCNC: 14.2 G/DL (ref 11.7–15.7)
IMM GRANULOCYTES # BLD: 0 10E3/UL
IMM GRANULOCYTES NFR BLD: 1 %
LDLC SERPL CALC-MCNC: 111 MG/DL
LYMPHOCYTES # BLD AUTO: 2.7 10E3/UL (ref 0.8–5.3)
LYMPHOCYTES NFR BLD AUTO: 40 %
MCH RBC QN AUTO: 32.8 PG (ref 26.5–33)
MCHC RBC AUTO-ENTMCNC: 35 G/DL (ref 31.5–36.5)
MCV RBC AUTO: 94 FL (ref 78–100)
MONOCYTES # BLD AUTO: 0.6 10E3/UL (ref 0–1.3)
MONOCYTES NFR BLD AUTO: 9 %
NEUTROPHILS # BLD AUTO: 3.3 10E3/UL (ref 1.6–8.3)
NEUTROPHILS NFR BLD AUTO: 49 %
NONHDLC SERPL-MCNC: 123 MG/DL
NRBC # BLD AUTO: 0 10E3/UL
NRBC BLD AUTO-RTO: 0 /100
PLATELET # BLD AUTO: 243 10E3/UL (ref 150–450)
POTASSIUM SERPL-SCNC: 4.7 MMOL/L (ref 3.4–5.3)
PROT SERPL-MCNC: 7.9 G/DL (ref 6.4–8.3)
RBC # BLD AUTO: 4.33 10E6/UL (ref 3.8–5.2)
SODIUM SERPL-SCNC: 139 MMOL/L (ref 135–145)
TRIGL SERPL-MCNC: 61 MG/DL
WBC # BLD AUTO: 6.8 10E3/UL (ref 4–11)

## 2025-07-17 PROCEDURE — G0463 HOSPITAL OUTPT CLINIC VISIT: HCPCS

## 2025-07-17 PROCEDURE — 36415 COLL VENOUS BLD VENIPUNCTURE: CPT | Mod: ZL | Performed by: PHYSICIAN ASSISTANT

## 2025-07-17 PROCEDURE — 80053 COMPREHEN METABOLIC PANEL: CPT | Mod: ZL | Performed by: PHYSICIAN ASSISTANT

## 2025-07-17 PROCEDURE — 11055 PARING/CUTG B9 HYPRKER LES 1: CPT | Performed by: PHYSICIAN ASSISTANT

## 2025-07-17 PROCEDURE — 80061 LIPID PANEL: CPT | Mod: ZL | Performed by: PHYSICIAN ASSISTANT

## 2025-07-17 PROCEDURE — 85025 COMPLETE CBC W/AUTO DIFF WBC: CPT | Mod: ZL | Performed by: PHYSICIAN ASSISTANT

## 2025-07-17 RX ORDER — MULTIVITAMIN WITH IRON
100 TABLET ORAL 2 TIMES DAILY
Qty: 180 TABLET | Refills: 4 | Status: SHIPPED | OUTPATIENT
Start: 2025-07-17

## 2025-07-17 RX ORDER — QUETIAPINE FUMARATE 200 MG/1
200 TABLET, FILM COATED ORAL 3 TIMES DAILY
Qty: 360 TABLET | Refills: 4 | Status: SHIPPED | OUTPATIENT
Start: 2025-07-17

## 2025-07-17 RX ORDER — CLONIDINE HYDROCHLORIDE 0.1 MG/1
0.1 TABLET ORAL 2 TIMES DAILY
Qty: 180 TABLET | Refills: 4 | Status: SHIPPED | OUTPATIENT
Start: 2025-07-17

## 2025-07-17 RX ORDER — CLONAZEPAM 1 MG/1
1 TABLET, ORALLY DISINTEGRATING ORAL
COMMUNITY
Start: 2025-05-28

## 2025-07-17 RX ORDER — MULTIVITAMIN
1 TABLET ORAL DAILY
Qty: 90 TABLET | Refills: 4 | Status: SHIPPED | OUTPATIENT
Start: 2025-07-17

## 2025-07-17 SDOH — HEALTH STABILITY: PHYSICAL HEALTH: ON AVERAGE, HOW MANY DAYS PER WEEK DO YOU ENGAGE IN MODERATE TO STRENUOUS EXERCISE (LIKE A BRISK WALK)?: 5 DAYS

## 2025-07-17 SDOH — HEALTH STABILITY: PHYSICAL HEALTH: ON AVERAGE, HOW MANY MINUTES DO YOU ENGAGE IN EXERCISE AT THIS LEVEL?: 30 MIN

## 2025-07-17 ASSESSMENT — SOCIAL DETERMINANTS OF HEALTH (SDOH): HOW OFTEN DO YOU GET TOGETHER WITH FRIENDS OR RELATIVES?: THREE TIMES A WEEK

## 2025-07-17 ASSESSMENT — PAIN SCALES - GENERAL: PAINLEVEL_OUTOF10: NO PAIN (0)

## 2025-07-17 NOTE — PATIENT INSTRUCTIONS
Patient Education   Preventive Care Advice   This is general advice given by our system to help you stay healthy. However, your care team may have specific advice just for you. Please talk to your care team about your preventive care needs.  Nutrition  Eat 5 or more servings of fruits and vegetables each day.  Try wheat bread, brown rice and whole grain pasta (instead of white bread, rice, and pasta).  Get enough calcium and vitamin D. Check the label on foods and aim for 100% of the RDA (recommended daily allowance).  Lifestyle  Exercise at least 150 minutes each week  (30 minutes a day, 5 days a week).  Do muscle strengthening activities 2 days a week. These help control your weight and prevent disease.  No smoking.  Wear sunscreen to prevent skin cancer.  Have a dental exam and cleaning every 6 months.  Yearly exams  See your health care team every year to talk about:  Any changes in your health.  Any medicines your care team has prescribed.  Preventive care, family planning, and ways to prevent chronic diseases.  Shots (vaccines)   HPV shots (up to age 26), if you've never had them before.  Hepatitis B shots (up to age 59), if you've never had them before.  COVID-19 shot: Get this shot when it's due.  Flu shot: Get a flu shot every year.  Tetanus shot: Get a tetanus shot every 10 years.  Pneumococcal, hepatitis A, and RSV shots: Ask your care team if you need these based on your risk.  Shingles shot (for age 50 and up)  General health tests  Diabetes screening:  Starting at age 35, Get screened for diabetes at least every 3 years.  If you are younger than age 35, ask your care team if you should be screened for diabetes.  Cholesterol test: At age 39, start having a cholesterol test every 5 years, or more often if advised.  Bone density scan (DEXA): At age 50, ask your care team if you should have this scan for osteoporosis (brittle bones).  Hepatitis C: Get tested at least once in your life.  STIs (sexually  transmitted infections)  Before age 24: Ask your care team if you should be screened for STIs.  After age 24: Get screened for STIs if you're at risk. You are at risk for STIs (including HIV) if:  You are sexually active with more than one person.  You don't use condoms every time.  You or a partner was diagnosed with a sexually transmitted infection.  If you are at risk for HIV, ask about PrEP medicine to prevent HIV.  Get tested for HIV at least once in your life, whether you are at risk for HIV or not.  Cancer screening tests  Cervical cancer screening: If you have a cervix, begin getting regular cervical cancer screening tests starting at age 21.  Breast cancer scan (mammogram): If you've ever had breasts, begin having regular mammograms starting at age 40. This is a scan to check for breast cancer.  Colon cancer screening: It is important to start screening for colon cancer at age 45.  Have a colonoscopy test every 10 years (or more often if you're at risk) Or, ask your provider about stool tests like a FIT test every year or Cologuard test every 3 years.  To learn more about your testing options, visit:   .  For help making a decision, visit:   https://bit.ly/oo03676.  Prostate cancer screening test: If you have a prostate, ask your care team if a prostate cancer screening test (PSA) at age 55 is right for you.  Lung cancer screening: If you are a current or former smoker ages 50 to 80, ask your care team if ongoing lung cancer screenings are right for you.  For informational purposes only. Not to replace the advice of your health care provider. Copyright   2023 [employer]. All rights reserved. Clinically reviewed by the [facility] Transitions Program. [employer] 367187 - REV 01/24.

## 2025-07-17 NOTE — NURSING NOTE
"Patient presents to the clinic for physical.    Chief Complaint   Patient presents with    Physical       Initial /70 (BP Location: Right arm, Patient Position: Sitting, Cuff Size: Adult Regular)   Pulse 100   Temp 98.2  F (36.8  C) (Tympanic)   Resp 20   Ht 1.588 m (5' 2.5\")   Wt 73.9 kg (163 lb)   BMI 29.34 kg/m   Estimated body mass index is 29.34 kg/m  as calculated from the following:    Height as of this encounter: 1.588 m (5' 2.5\").    Weight as of this encounter: 73.9 kg (163 lb).  Medication Reconciliation: complete        Jeanine Lopez LPN    "

## 2025-07-17 NOTE — PROGRESS NOTES
Preventive Care Visit  [facility]  Esther Fraga PA-C, Family Medicine  Jul 17, 2025      Assessment & Plan       ICD-10-CM    1. Routine general medical examination at a health care facility  Z00.00       2. Oppositional defiant disorder  F91.3 cloNIDine (CATAPRES) 0.1 MG tablet     QUEtiapine (SEROQUEL) 200 MG tablet      3. Undersocialized conduct disorder, aggressive type  F91.1 cloNIDine (CATAPRES) 0.1 MG tablet     QUEtiapine (SEROQUEL) 200 MG tablet      4. Health care maintenance  Z00.00 multivitamin (ONE-DAILY) tablet     vitamin B6 (PYRIDOXINE) 100 MG tablet      5. Lipid screening  Z13.220 CBC and Differential     Comprehensive Metabolic Panel     Lipid Panel     CBC and Differential     Comprehensive Metabolic Panel     Lipid Panel      6. Callus of foot  L84 TRIM HYPERKERATOTIC SKIN LESION, ONE        Annual physical completed today, up-to-date on all care gaps.  Up-to-date on immunizations.  Due for next Pap smear in 2026.  Oppositional defiant disorder, guardian, Analia reported some increased behaviors (head-banging and self-harm when told no), this is greatly improved since talking to the / at [facility].  They would like to hold off on any medication changes at this time.  Refilled clonidine and Seroquel at current dosing.  Return precautions reviewed.  Conduct disorder, greatly improved recently.  Continued close follow-up.  Refilled medications.  Aware of 211/crisis line information as well as other local resources for support.  Continued supportive relationship with family and friends.  Continue to participate in activities that bring Cassidi dre.  Healthcare maintenance, refill multivitamin and B6 at current dosing.  Lipid screening updated today. Cholesterol 175 (previously 137 in February 2020), triglycerides 61 (previously 89), HDL 52 (previously 36),  (previously 83). Recommend increased lean proteins, fruits and vegetable intake. Goal exercise 150  "minutes of moderate exercise per week (walking is great exercise). Weight loss would benefit cardiopulmonary and overall health.   Callus of foot - medial border of right great toe, I pared this down with a #15 blade, tolerated well.  May use a pumice stone at home as needed.  Return precautions reviewed    Patient has been advised of split billing requirements and indicates understanding: Yes    BMI  Estimated body mass index is 29.34 kg/m  as calculated from the following:    Height as of this encounter: 1.588 m (5' 2.5\").    Weight as of this encounter: 73.9 kg (163 lb).   Weight management plan: Discussed healthy diet and exercise guidelines    Counseling  Appropriate preventive services were addressed with this patient via screening, questionnaire, or discussion as appropriate for fall prevention, nutrition, physical activity, Tobacco-use cessation, social engagement, weight loss and cognition.  Checklist reviewing preventive services available has been given to the patient.  Reviewed patient's diet, addressing concerns and/or questions.   Reviewed preventive health counseling, as reflected in patient instructions    Follow-up  Return in about 53 weeks (around 7/23/2026) for Annual Wellness Visit.    The longitudinal plan of care for the diagnosis(es)/condition(s) as documented were addressed during this visit. Due to the added complexity in care, I will continue to support Fadia in the subsequent management and with ongoing continuity of care.    Terrence Loaiza is a 25 year old, presenting for the following:  Physical        7/17/2025     8:58 AM   Additional Questions   Roomed by oscar rosario lpn   Accompanied by luiza          Healthy Habits:     Taking medications regularly:  0  History of Present Illness       Reason for visit:  Annual check upShe exercises with enough effort to increase her heart rate 20 to 29 minutes per day.  She exercises with enough effort to increase her heart rate 5 days " per week.   She is taking medications regularly.    Fadia presents to the clinic for annual physical. Last physical May 2024 with me.    Female Physical:  Contraception: Depo every 3-months  Risk for STI?: none  Last pap: 2/13/23 - normal - due 2026  Any hx of abnormal paps:  none  FH of early CA?: none  Tobacco?: never user  Calcium intake: dietary  DEXA: N/A  Last mammogram: under age 40 without pertinent history  Colon cancer screening: under age 45 without pertinent history  Immunizations: up to date    Follows with [facility] for complex partial epilepsy.   Current prescriptions managed by neurology - Keppra 1000 mg BID, Lamictal 300 mg BID, Klonopin 0.5 mg QPM, Klonopin ODT for seizures    Additional medications:  - Vitamin B6 - 150 mg in AM and 50 mg at night  - Seroquel 200 mg TID for ODD  - Clonidine 0.05 mg QAM and 0.1 mg QPM  - Artificial saliva for dry mouth  - Vitamin D3 1000 mcg daily  - Depo injection    Went to a barn-dance and Saturday of last weekend, had a blast dancing.  Has an area of dead skin/callus on her right big toe, wondering if this can be cut off today as it is causing some pressure.  Upcoming trip to the [facility] next week, very excited about this.  Continues to stay active with family and friends.    Advance Care Planning  Discussed advance care planning with patient; informed AVS has link to [employer].        7/17/2025   General Health   How would you rate your overall physical health? Good   Feel stress (tense, anxious, or unable to sleep) Only a little   (!) STRESS CONCERN      7/17/2025   Nutrition   Three or more servings of calcium each day? Yes   Diet: Regular (no restrictions)   How many servings of fruit and vegetables per day? (!) 2-3   How many sweetened beverages each day? 0-1         7/17/2025   Exercise   Days per week of moderate/strenous exercise 5 days   Average minutes spent exercising at this level 30 min         7/17/2025   Social  Factors   Frequency of gathering with friends or relatives Three times a week   Worry food won't last until get money to buy more No   Food not last or not have enough money for food? No   Do you have housing? (Housing is defined as stable permanent housing and does not include staying outside in a car, in a tent, in an abandoned building, in an overnight shelter, or couch-surfing.) Yes   Are you worried about losing your housing? No   Lack of transportation? No   Unable to get utilities (heat,electricity)? No         7/17/2025   Dental   Dentist two times every year? Yes     Today's PHQ-2 Score:       7/17/2025     8:45 AM   PHQ-2 ( 1999 [employer])   Q1: Little interest or pleasure in doing things 0   Q2: Feeling down, depressed or hopeless 0   PHQ-2 Score 0    Q1: Little interest or pleasure in doing things Not at all   Q2: Feeling down, depressed or hopeless Not at all   PHQ-2 Score 0       Patient-reported         7/17/2025   Substance Use   Alcohol more than 3/day or more than 7/wk Not Applicable   Do you use any other substances recreationally? No     Social History     Tobacco Use    Smoking status: Passive Smoke Exposure - Never Smoker    Smokeless tobacco: Never    Tobacco comments:     Quit smoking: Patient's mother smokes in the house   Vaping Use    Vaping status: Never Used   Substance Use Topics    Alcohol use: Never     Alcohol/week: 0.0 standard drinks of alcohol    Drug use: Never     Mammogram Screening - Patient under 40 years of age: Routine Mammogram Screening not recommended.         7/17/2025   STI Screening   New sexual partner(s) since last STI/HIV test? No     History of abnormal Pap smear: No - age 21-29 PAP every 3 years recommended        2/13/2023     9:13 AM   PAP / HPV   PAP Negative for Intraepithelial Lesion or Malignancy (NILM)          7/17/2025   Contraception/Family Planning   Questions about contraception or family planning No   What are your periods like? Not currently having  "periods     Reviewed and updated as needed this visit by Provider   Tobacco  Allergies  Meds  Problems  Med Hx  Surg Hx  Fam Hx            Past Medical History:   Diagnosis Date    Attention-deficit hyperactivity disorder     06,See Dr. Bonilla's note    Epilepsy without status epilepticus, not intractable (H)     complex partial seizures with secondary generalization triggered by fevers; followed by Dr. Hu    Oppositional defiant disorder     emotionally reactive aggressive behavior    Other disorders of psychological development      10/2005,Testing done at [place]    Other postprocedural complications and disorders of digestive system     8 and 9    Personal history of other diseases of the female genital tract     Vaginal delivery at 39 weeks gestation    Personal history of other medical treatment (CODE)     No Comments Provided    Pervasive developmental disorder     06,See Dr. Bonilla's note     Past Surgical History:   Procedure Laterality Date    OTHER SURGICAL HISTORY      34318.0,PAST SURGICAL HISTORY,No surgeries.     OB History    Para Term  AB Living   0 0 0 0 0 0   SAB IAB Ectopic Multiple Live Births   0 0 0 0 0     Review of Systems  Constitutional, HEENT, cardiovascular, pulmonary, GI, , musculoskeletal, neuro, skin, endocrine and psych systems are negative, except as otherwise noted.    Objective    Exam  /70 (BP Location: Right arm, Patient Position: Sitting, Cuff Size: Adult Regular)   Pulse 100   Temp 98.2  F (36.8  C) (Tympanic)   Resp 20   Ht 1.588 m (5' 2.5\")   Wt 73.9 kg (163 lb)   BMI 29.34 kg/m     Estimated body mass index is 29.34 kg/m  as calculated from the following:    Height as of this encounter: 1.588 m (5' 2.5\").    Weight as of this encounter: 73.9 kg (163 lb).    Physical Exam  GENERAL: alert and no distress  EYES: Eyes grossly normal to inspection  HENT: ear canals and TM's normal, nose and mouth without ulcers or lesions  NECK: " no adenopathy, no asymmetry, masses, or scars  RESP: lungs clear to auscultation - no rales, rhonchi or wheezes  CV: regular rate and rhythm, normal S1 S2, no S3 or S4, no murmur, click or rub, no peripheral edema  MS: no gross musculoskeletal defects noted, no edema  SKIN: no suspicious lesions or rashes  NEURO: Normal strength and tone, mentation intact and speech normal  PSYCH: mentation appears normal, affect normal/bright  Foot exam: normal DP and PT pulses, normal sensory exam, and 2 cm callus to the medial border of great toe of right foot    Results for orders placed or performed in visit on 07/17/25   Comprehensive Metabolic Panel     Status: Abnormal   Result Value Ref Range    Sodium 139 135 - 145 mmol/L    Potassium 4.7 3.4 - 5.3 mmol/L    Carbon Dioxide (CO2) 25 22 - 29 mmol/L    Anion Gap 12 7 - 15 mmol/L    Urea Nitrogen 18.5 6.0 - 20.0 mg/dL    Creatinine 0.87 0.51 - 0.95 mg/dL    GFR Estimate >90 >60 mL/min/1.73m2    Calcium 10.4 8.8 - 10.4 mg/dL    Chloride 102 98 - 107 mmol/L    Glucose 100 (H) 70 - 99 mg/dL    Alkaline Phosphatase 61 40 - 150 U/L    AST 24 0 - 45 U/L    ALT 20 0 - 50 U/L    Protein Total 7.9 6.4 - 8.3 g/dL    Albumin 4.9 3.5 - 5.2 g/dL    Bilirubin Total 0.4 <=1.2 mg/dL    Patient Fasting > 8hrs? No    Lipid Panel     Status: Abnormal   Result Value Ref Range    Cholesterol 175 <200 mg/dL    Triglycerides 61 <150 mg/dL    Direct Measure HDL 52 >=50 mg/dL    LDL Cholesterol Calculated 111 (H) <100 mg/dL    Non HDL Cholesterol 123 <130 mg/dL    Patient Fasting > 8hrs? No     Narrative    Cholesterol  Desirable: < 200 mg/dL  Borderline High: 200 - 239 mg/dL  High: >= 240 mg/dL    Triglycerides  Normal: < 150 mg/dL  Borderline High: 150 - 199 mg/dL  High: 200-499 mg/dL  Very High: >= 500 mg/dL    Direct Measure HDL  Female: >= 50 mg/dL   Male: >= 40 mg/dL    LDL Cholesterol  Desirable: < 100 mg/dL  Above Desirable: 100 - 129 mg/dL   Borderline High: 130 - 159 mg/dL   High:  160 - 189  mg/dL   Very High: >= 190 mg/dL    Non HDL Cholesterol  Desirable: < 130 mg/dL  Above Desirable: 130 - 159 mg/dL  Borderline High: 160 - 189 mg/dL  High: 190 - 219 mg/dL  Very High: >= 220 mg/dL   CBC with platelets and differential     Status: None   Result Value Ref Range    WBC Count 6.8 4.0 - 11.0 10e3/uL    RBC Count 4.33 3.80 - 5.20 10e6/uL    Hemoglobin 14.2 11.7 - 15.7 g/dL    Hematocrit 40.6 35.0 - 47.0 %    MCV 94 78 - 100 fL    MCH 32.8 26.5 - 33.0 pg    MCHC 35.0 31.5 - 36.5 g/dL    RDW 11.8 10.0 - 15.0 %    Platelet Count 243 150 - 450 10e3/uL    % Neutrophils 49 %    % Lymphocytes 40 %    % Monocytes 9 %    % Eosinophils 2 %    % Basophils 0 %    % Immature Granulocytes 1 %    NRBCs per 100 WBC 0 <1 /100    Absolute Neutrophils 3.3 1.6 - 8.3 10e3/uL    Absolute Lymphocytes 2.7 0.8 - 5.3 10e3/uL    Absolute Monocytes 0.6 0.0 - 1.3 10e3/uL    Absolute Eosinophils 0.1 0.0 - 0.7 10e3/uL    Absolute Basophils 0.0 0.0 - 0.2 10e3/uL    Absolute Immature Granulocytes 0.0 <=0.4 10e3/uL    Absolute NRBCs 0.0 10e3/uL   CBC and Differential     Status: None    Narrative    The following orders were created for panel order CBC and Differential.  Procedure                               Abnormality         Status                     ---------                               -----------         ------                     CBC with platelets and ...[[id number]]                      Final result                 Please view results for these tests on the individual orders.     Signed Electronically by: Esther Fraga PA-C

## 2025-08-20 ENCOUNTER — DOCUMENTATION ONLY (OUTPATIENT)
Dept: OTHER | Facility: CLINIC | Age: 25
End: 2025-08-20
Payer: MEDICAID

## (undated) RX ORDER — MEDROXYPROGESTERONE ACETATE 150 MG/ML
INJECTION, SUSPENSION INTRAMUSCULAR
Status: DISPENSED
Start: 2024-01-26

## (undated) RX ORDER — MEDROXYPROGESTERONE ACETATE 150 MG/ML
INJECTION, SUSPENSION INTRAMUSCULAR
Status: DISPENSED
Start: 2025-06-24

## (undated) RX ORDER — MEDROXYPROGESTERONE ACETATE 150 MG/ML
INJECTION, SUSPENSION INTRAMUSCULAR
Status: DISPENSED
Start: 2024-07-19

## (undated) RX ORDER — MEDROXYPROGESTERONE ACETATE 150 MG/ML
INJECTION, SUSPENSION INTRAMUSCULAR
Status: DISPENSED
Start: 2018-07-17

## (undated) RX ORDER — MEDROXYPROGESTERONE ACETATE 150 MG/ML
INJECTION, SUSPENSION INTRAMUSCULAR
Status: DISPENSED
Start: 2022-11-28

## (undated) RX ORDER — MEDROXYPROGESTERONE ACETATE 150 MG/ML
INJECTION, SUSPENSION INTRAMUSCULAR
Status: DISPENSED
Start: 2019-11-14

## (undated) RX ORDER — MEDROXYPROGESTERONE ACETATE 150 MG/ML
INJECTION, SUSPENSION INTRAMUSCULAR
Status: DISPENSED
Start: 2021-01-08

## (undated) RX ORDER — MEDROXYPROGESTERONE ACETATE 150 MG/ML
INJECTION, SUSPENSION INTRAMUSCULAR
Status: DISPENSED
Start: 2020-04-22

## (undated) RX ORDER — MEDROXYPROGESTERONE ACETATE 150 MG/ML
INJECTION, SUSPENSION INTRAMUSCULAR
Status: DISPENSED
Start: 2020-02-04

## (undated) RX ORDER — MEDROXYPROGESTERONE ACETATE 150 MG/ML
INJECTION, SUSPENSION INTRAMUSCULAR
Status: DISPENSED
Start: 2019-08-29

## (undated) RX ORDER — MEDROXYPROGESTERONE ACETATE 150 MG/ML
INJECTION, SUSPENSION INTRAMUSCULAR
Status: DISPENSED
Start: 2019-06-11

## (undated) RX ORDER — MEDROXYPROGESTERONE ACETATE 150 MG/ML
INJECTION, SUSPENSION INTRAMUSCULAR
Status: DISPENSED
Start: 2018-10-09

## (undated) RX ORDER — MEDROXYPROGESTERONE ACETATE 150 MG/ML
INJECTION, SUSPENSION INTRAMUSCULAR
Status: DISPENSED
Start: 2023-02-13

## (undated) RX ORDER — MEDROXYPROGESTERONE ACETATE 150 MG/ML
INJECTION, SUSPENSION INTRAMUSCULAR
Status: DISPENSED
Start: 2021-09-15

## (undated) RX ORDER — MEDROXYPROGESTERONE ACETATE 150 MG/ML
INJECTION, SUSPENSION INTRAMUSCULAR
Status: DISPENSED
Start: 2023-05-12

## (undated) RX ORDER — MEDROXYPROGESTERONE ACETATE 150 MG/ML
INJECTION, SUSPENSION INTRAMUSCULAR
Status: DISPENSED
Start: 2018-04-26

## (undated) RX ORDER — MEDROXYPROGESTERONE ACETATE 150 MG/ML
INJECTION, SUSPENSION INTRAMUSCULAR
Status: DISPENSED
Start: 2024-04-26

## (undated) RX ORDER — MEDROXYPROGESTERONE ACETATE 150 MG/ML
INJECTION, SUSPENSION INTRAMUSCULAR
Status: DISPENSED
Start: 2020-10-16

## (undated) RX ORDER — MEDROXYPROGESTERONE ACETATE 150 MG/ML
INJECTION, SUSPENSION INTRAMUSCULAR
Status: DISPENSED
Start: 2023-11-03

## (undated) RX ORDER — MEDROXYPROGESTERONE ACETATE 150 MG/ML
INJECTION, SUSPENSION INTRAMUSCULAR
Status: DISPENSED
Start: 2020-07-21

## (undated) RX ORDER — MEDROXYPROGESTERONE ACETATE 150 MG/ML
INJECTION, SUSPENSION INTRAMUSCULAR
Status: DISPENSED
Start: 2019-01-03

## (undated) RX ORDER — MEDROXYPROGESTERONE ACETATE 150 MG/ML
INJECTION, SUSPENSION INTRAMUSCULAR
Status: DISPENSED
Start: 2021-12-14

## (undated) RX ORDER — MEDROXYPROGESTERONE ACETATE 150 MG/ML
INJECTION, SUSPENSION INTRAMUSCULAR
Status: DISPENSED
Start: 2021-04-02

## (undated) RX ORDER — MEDROXYPROGESTERONE ACETATE 150 MG/ML
INJECTION, SUSPENSION INTRAMUSCULAR
Status: DISPENSED
Start: 2022-06-03

## (undated) RX ORDER — LIDOCAINE HYDROCHLORIDE 10 MG/ML
INJECTION, SOLUTION INFILTRATION; PERINEURAL
Status: DISPENSED
Start: 2018-03-19

## (undated) RX ORDER — MEDROXYPROGESTERONE ACETATE 150 MG/ML
INJECTION, SUSPENSION INTRAMUSCULAR
Status: DISPENSED
Start: 2019-03-26

## (undated) RX ORDER — MEDROXYPROGESTERONE ACETATE 150 MG/ML
INJECTION, SUSPENSION INTRAMUSCULAR
Status: DISPENSED
Start: 2022-03-11

## (undated) RX ORDER — MEDROXYPROGESTERONE ACETATE 150 MG/ML
INJECTION, SUSPENSION INTRAMUSCULAR
Status: DISPENSED
Start: 2022-08-31

## (undated) RX ORDER — MEDROXYPROGESTERONE ACETATE 150 MG/ML
INJECTION, SUSPENSION INTRAMUSCULAR
Status: DISPENSED
Start: 2021-06-25

## (undated) RX ORDER — MEDROXYPROGESTERONE ACETATE 150 MG/ML
INJECTION, SUSPENSION INTRAMUSCULAR
Status: DISPENSED
Start: 2024-10-14

## (undated) RX ORDER — MEDROXYPROGESTERONE ACETATE 150 MG/ML
INJECTION, SUSPENSION INTRAMUSCULAR
Status: DISPENSED
Start: 2023-08-10